# Patient Record
Sex: FEMALE | Race: WHITE | NOT HISPANIC OR LATINO | Employment: FULL TIME | URBAN - METROPOLITAN AREA
[De-identification: names, ages, dates, MRNs, and addresses within clinical notes are randomized per-mention and may not be internally consistent; named-entity substitution may affect disease eponyms.]

---

## 2017-02-09 ENCOUNTER — OFFICE VISIT (OUTPATIENT)
Dept: OPTOMETRY | Facility: CLINIC | Age: 62
End: 2017-02-09
Payer: COMMERCIAL

## 2017-02-09 DIAGNOSIS — Z46.0 FITTING AND ADJUSTMENT OF SPECTACLES AND CONTACT LENSES: Primary | ICD-10-CM

## 2017-02-09 DIAGNOSIS — Z83.511 FAMILY HISTORY OF GLAUCOMA IN BROTHER: ICD-10-CM

## 2017-02-09 DIAGNOSIS — H52.4 PRESBYOPIA: ICD-10-CM

## 2017-02-09 DIAGNOSIS — H40.003 GLAUCOMA SUSPECT, BILATERAL: ICD-10-CM

## 2017-02-09 DIAGNOSIS — H52.13 MYOPIA, BILATERAL: Primary | ICD-10-CM

## 2017-02-09 PROCEDURE — 99999 PR PBB SHADOW E&M-EST. PATIENT-LVL II: CPT | Mod: PBBFAC,,, | Performed by: OPTOMETRIST

## 2017-02-09 PROCEDURE — 92133 CPTRZD OPH DX IMG PST SGM ON: CPT | Mod: S$GLB,,, | Performed by: OPTOMETRIST

## 2017-02-09 PROCEDURE — 92004 COMPRE OPH EXAM NEW PT 1/>: CPT | Mod: S$GLB,,, | Performed by: OPTOMETRIST

## 2017-02-09 PROCEDURE — 92310 CONTACT LENS FITTING OU: CPT | Mod: ,,, | Performed by: OPTOMETRIST

## 2017-02-09 PROCEDURE — 92015 DETERMINE REFRACTIVE STATE: CPT | Mod: S$GLB,,, | Performed by: OPTOMETRIST

## 2017-02-09 NOTE — PROGRESS NOTES
HPI     Patient's age: 61 y.o.    Approximate date of last eye examination:  3/7/13  Name of last eye doctor seen: Dr. Asher  Pt states that she is here for   new eye exam for contacts and glasses, not having any problems with eyes.    Wears glasses? yes     Wears CLs?:  yes           If yes:              Type of CL worn:  Air [employer]ix              Wears full-time or part-time:  Full-time               Sleeps with contact lenses:  yes               Headaches?  no  Eye pain/discomfort?  no                                                                                     Flashes?  no  Floaters?  no  Diplopia/Double vision?  no    Patient's Ocular History:         Any eye surgeries? no        Family history of eye disease?  Glaucoma - Father    Significant patient medical history:         1. Diabetes?  no       If yes, IDDM or NIDDM? no   2. HBP?  no                 ! OTC eyedrops currently using:  Clear eyes - OU PRN   ! Prescription eye meds currently using:  none           Last edited by Andreea Mijares MA on 2/9/2017  9:45 AM.         Assessment /Plan     For exam results, see Encounter Report.    Myopia, bilateral  Presbyopia   Rx specs, SV distance    CL fit today: dispensed trials of BL ultra MF    Replace monthly   Also gave trials of CIba N&D with new fuentes   Ok to order whichever is preferred    Glaucoma suspect, bilateral  -     Posterior Segment OCT Optic Nerve- WNL OU   Prior testing WNL in 2013  Family history of glaucoma in brother and father      Mild NS OU   Monitor yearly

## 2017-06-06 ENCOUNTER — TELEPHONE (OUTPATIENT)
Dept: INTERNAL MEDICINE | Facility: CLINIC | Age: 62
End: 2017-06-06

## 2017-06-06 DIAGNOSIS — Z12.39 BREAST SCREENING: Primary | ICD-10-CM

## 2017-06-06 NOTE — TELEPHONE ENCOUNTER
----- Message from Ana Almaraz sent at 6/6/2017 11:28 AM CDT -----  Contact: self/[phone]  Pt called in regards to getting orders to get a mammogram done. Pt would like to get it done on Thursday (6-8-17) at 10:30.      Please advise

## 2017-06-08 ENCOUNTER — OFFICE VISIT (OUTPATIENT)
Dept: INTERNAL MEDICINE | Facility: CLINIC | Age: 62
End: 2017-06-08
Payer: COMMERCIAL

## 2017-06-08 ENCOUNTER — HOSPITAL ENCOUNTER (OUTPATIENT)
Dept: RADIOLOGY | Facility: HOSPITAL | Age: 62
Discharge: HOME | End: 2017-06-08
Attending: INTERNAL MEDICINE
Payer: COMMERCIAL

## 2017-06-08 VITALS
HEIGHT: 68 IN | BODY MASS INDEX: 21.31 KG/M2 | SYSTOLIC BLOOD PRESSURE: 108 MMHG | WEIGHT: 140.63 LBS | HEART RATE: 61 BPM | OXYGEN SATURATION: 98 % | DIASTOLIC BLOOD PRESSURE: 62 MMHG

## 2017-06-08 DIAGNOSIS — M81.0 OSTEOPOROSIS, UNSPECIFIED OSTEOPOROSIS TYPE, UNSPECIFIED PATHOLOGICAL FRACTURE PRESENCE: ICD-10-CM

## 2017-06-08 DIAGNOSIS — Z00.00 ANNUAL PHYSICAL EXAM: Primary | ICD-10-CM

## 2017-06-08 DIAGNOSIS — Z12.39 BREAST SCREENING: ICD-10-CM

## 2017-06-08 DIAGNOSIS — E03.9 HYPOTHYROIDISM, UNSPECIFIED TYPE: ICD-10-CM

## 2017-06-08 LAB
BILIRUB UR QL STRIP: NEGATIVE
CAOX CRY UR QL COMP ASSIST: NORMAL
CLARITY UR REFRACT.AUTO: ABNORMAL
COLOR UR AUTO: YELLOW
GLUCOSE UR QL STRIP: NEGATIVE
HGB UR QL STRIP: NEGATIVE
KETONES UR QL STRIP: NEGATIVE
LEUKOCYTE ESTERASE UR QL STRIP: NEGATIVE
MICROSCOPIC COMMENT: NORMAL
NITRITE UR QL STRIP: NEGATIVE
PH UR STRIP: 6 [PH] (ref 5–8)
PROT UR QL STRIP: NEGATIVE
SP GR UR STRIP: 1.02 (ref 1–1.03)
SQUAMOUS #/AREA URNS AUTO: 1 /HPF
URN SPEC COLLECT METH UR: ABNORMAL
UROBILINOGEN UR STRIP-ACNC: NEGATIVE EU/DL

## 2017-06-08 PROCEDURE — 99999 PR PBB SHADOW E&M-EST. PATIENT-LVL III: CPT | Mod: PBBFAC,,, | Performed by: NURSE PRACTITIONER

## 2017-06-08 PROCEDURE — 81001 URINALYSIS AUTO W/SCOPE: CPT

## 2017-06-08 PROCEDURE — 77067 SCR MAMMO BI INCL CAD: CPT | Mod: TC

## 2017-06-08 PROCEDURE — 77067 SCR MAMMO BI INCL CAD: CPT | Mod: 26,,, | Performed by: RADIOLOGY

## 2017-06-08 PROCEDURE — 77063 BREAST TOMOSYNTHESIS BI: CPT | Mod: 26,,, | Performed by: RADIOLOGY

## 2017-06-08 PROCEDURE — 99396 PREV VISIT EST AGE 40-64: CPT | Mod: S$GLB,,, | Performed by: NURSE PRACTITIONER

## 2017-06-08 RX ORDER — ALENDRONATE SODIUM 70 MG/1
70 TABLET ORAL
Qty: 4 TABLET | Refills: 11 | Status: SHIPPED | OUTPATIENT
Start: 2017-06-08 | End: 2018-08-28

## 2017-06-08 RX ORDER — LEVOTHYROXINE SODIUM 50 UG/1
50 TABLET ORAL DAILY
Qty: 90 TABLET | Refills: 3 | Status: SHIPPED | OUTPATIENT
Start: 2017-06-08 | End: 2017-08-11

## 2017-06-08 NOTE — PROGRESS NOTES
Internal Medicine Annual Exam       CHIEF COMPLAINT     The patient, Rosemary Parkinson, who is a 62 y.o. female presents for an annual exam.    HPI   Pt presents to the clinic for annual exam.     Hypothyroidism- compliant with levothyroxine.     MMG today     Dexa with t-score -2.5 score. Never received message about starting fosamax. Does take vit D and Calcium.       Past Medical History:  Past Medical History:   Diagnosis Date    Hx of adenomatous colonic polyps     Hypothyroid        Past Surgical History:   Procedure Laterality Date    APPENDECTOMY       SECTION      COLONOSCOPY N/A 2016    Procedure: COLONOSCOPY;  Surgeon: Mati Banks MD;  Location: [facility] ([address] [place] [address]);  Service: Endoscopy;  Laterality: N/A;    HYSTERECTOMY      TONSILLECTOMY, ADENOIDECTOMY      TOTAL ABDOMINAL HYSTERECTOMY W/ BILATERAL SALPINGOOPHORECTOMY          Family History   Problem Relation Age of Onset    Cancer Father      colon    Glaucoma Father     Cancer Maternal Grandmother 70     colon    Glaucoma Brother         Social History     Social History    Marital status:      Spouse name: N/A    Number of children: 4    Years of education: N/A     Occupational History     [employer]     Social History Main Topics    Smoking status: Never Smoker    Smokeless tobacco: Not on file      Comment: in college    Alcohol use No      Comment: social     Drug use: No    Sexual activity: Not on file      Comment: , 4 kids under 5      Other Topics Concern    Not on file     Social History Narrative    No narrative on file        History   Smoking Status    Never Smoker   Smokeless Tobacco    Not on file     Comment: in college        Allergies as of 2017 - Reviewed 2017   Allergen Reaction Noted    Demerol [meperidine] Other (See Comments) 12/10/2012          Home Medications:  Prior to Admission medications    Medication Sig Start Date End Date Taking? Authorizing  Provider   levothyroxine (SYNTHROID) 50 MCG tablet Take 1 tablet (50 mcg total) by mouth once daily. 5/25/16  Yes Cindy Elaine MD   TAZORAC 0.1 % cream  10/16/13   Historical Provider, MD       Review of Systems:  Review of Systems    Health Maintainence:   Immunizations:  Health Maintenance       Date Due Completion Date    Hepatitis C Screening 1955 ---    Zoster Vaccine 03/13/2015 ---    Mammogram 04/13/2017 4/13/2016    Influenza Vaccine 08/01/2017 9/25/2014    DEXA SCAN 05/27/2019 5/27/2016    Colonoscopy 05/02/2021 5/2/2016    Override on 6/7/2010: Done    Lipid Panel 05/10/2021 5/10/2016    TETANUS VACCINE 05/25/2026 5/25/2016           PHYSICAL EXAM     There were no vitals taken for this visit. There is no height or weight on file to calculate BMI.    Physical Exam    LABS     Lab Results   Component Value Date    HGBA1C 5.3 12/04/2012     CMP  Sodium   Date Value Ref Range Status   05/10/2016 142 136 - 145 mmol/L Final     Potassium   Date Value Ref Range Status   05/10/2016 4.5 3.5 - 5.1 mmol/L Final     Chloride   Date Value Ref Range Status   05/10/2016 108 95 - 110 mmol/L Final     CO2   Date Value Ref Range Status   05/10/2016 27 23 - 29 mmol/L Final     Glucose   Date Value Ref Range Status   05/10/2016 89 70 - 110 mg/dL Final     BUN, Bld   Date Value Ref Range Status   05/10/2016 15 8 - 23 mg/dL Final     Creatinine   Date Value Ref Range Status   05/10/2016 0.8 0.5 - 1.4 mg/dL Final     Calcium   Date Value Ref Range Status   05/10/2016 8.9 8.7 - 10.5 mg/dL Final     Total Protein   Date Value Ref Range Status   05/10/2016 6.7 6.0 - 8.4 g/dL Final     Albumin   Date Value Ref Range Status   05/10/2016 3.9 3.5 - 5.2 g/dL Final     Total Bilirubin   Date Value Ref Range Status   05/10/2016 0.4 0.1 - 1.0 mg/dL Final     Comment:     For infants and newborns, interpretation of results should be based  on gestational age, weight and in agreement with clinical  observations.  Premature Infant  recommended reference ranges:  Up to 24 hours.............<8.0 mg/dL  Up to 48 hours............<12.0 mg/dL  3-5 days..................<15.0 mg/dL  6-29 days.................<15.0 mg/dL       Alkaline Phosphatase   Date Value Ref Range Status   05/10/2016 62 55 - 135 U/L Final     AST   Date Value Ref Range Status   05/10/2016 22 10 - 40 U/L Final     ALT   Date Value Ref Range Status   05/10/2016 22 10 - 44 U/L Final     Anion Gap   Date Value Ref Range Status   05/10/2016 7 (L) 8 - 16 mmol/L Final     eGFR if    Date Value Ref Range Status   05/10/2016 >60.0 >60 mL/min/1.73 m^2 Final     eGFR if non    Date Value Ref Range Status   05/10/2016 >60.0 >60 mL/min/1.73 m^2 Final     Comment:     Calculation used to obtain the estimated glomerular filtration  rate (eGFR) is the CKD-EPI equation. Since race is unknown   in our information system, the eGFR values for   -American and Non--American patients are given   for each creatinine result.       Lab Results   Component Value Date    WBC 4.66 05/10/2016    HGB 13.3 05/10/2016    HCT 41.9 05/10/2016    MCV 91 05/10/2016     05/10/2016     Lab Results   Component Value Date    CHOL 223 (H) 05/10/2016    CHOL 224 (H) 12/04/2012     Lab Results   Component Value Date    HDL 72 05/10/2016    HDL 62 12/04/2012     Lab Results   Component Value Date    LDLCALC 139.2 05/10/2016    LDLCALC 139.0 12/04/2012     Lab Results   Component Value Date    TRIG 59 05/10/2016    TRIG 114 12/04/2012     Lab Results   Component Value Date    CHOLHDL 32.3 05/10/2016    CHOLHDL 27.7 12/04/2012     Lab Results   Component Value Date    TSH 3.057 05/10/2016       ASSESSMENT/PLAN     Rosemary Parkinson is a 62 y.o. female with  Past Medical History:   Diagnosis Date    Hx of adenomatous colonic polyps     Hypothyroid      Annual physical exam  -     CBC auto differential; Future; Expected date: 06/08/2017  -     Comprehensive metabolic panel;  Future; Expected date: 06/08/2017  -     Lipid panel; Future; Expected date: 06/08/2017  -     Hemoglobin A1c; Future; Expected date: 06/08/2017  -     TSH; Future; Expected date: 06/08/2017  -     T4, free; Future; Expected date: 06/08/2017  -     Urinalysis    Hypothyroidism, unspecified type- stable. Will check TSH today. Cont current dose.   -     levothyroxine (SYNTHROID) 50 MCG tablet; Take 1 tablet (50 mcg total) by mouth once daily.  Dispense: 90 tablet; Refill: 3    Osteoporosis, unspecified osteoporosis type, unspecified pathological fracture presence- Dexa 5/2016 with t-score- 2.5. Will start fosamax as pt did not receive message about this. Discussed weight bearing exercises. Cont vit D.   -     alendronate (FOSAMAX) 70 MG tablet; Take 1 tablet (70 mg total) by mouth every 7 days.  Dispense: 4 tablet; Refill: 11            Follow up with PCP in 1 year for annual.     Clotilde BARBOSA, APRN, FNP-c   Department of Internal Medicine - [facility]  9:29 AM

## 2017-06-08 NOTE — MEDICAL/APP STUDENT
Subjective:       Patient ID: Rosemary Parkinson is a 62 y.o. female.    Chief Complaint: Annual Exam    Ms. Parkinson presented to the clinic this morning for annual exam. Patient has no active complaints. She states that she keeps physically active with taking care of  her 4 children, who are all under the age of 9. Patient reports eating a healthy diet, low in fats and red meats. She does endorse occasional bloating, which she attributes to lactose intolerance and is trying probiotics. Per her most recent DEXA scan, completed 5/27/2016, she has osteoporosis.       Review of Systems   Constitutional: Negative for activity change, appetite change, fatigue and unexpected weight change.   HENT: Negative for congestion, postnasal drip and sore throat.    Eyes: Negative.    Respiratory: Negative for cough, shortness of breath and wheezing.    Cardiovascular: Negative for chest pain and palpitations.   Gastrointestinal: Negative for abdominal pain, constipation, diarrhea and nausea.        Occasional bloating   Endocrine: Negative.    Genitourinary: Negative.    Musculoskeletal: Negative.    Skin: Negative.    Allergic/Immunologic: Negative.    Neurological: Negative for dizziness, numbness and headaches.   Hematological: Does not bruise/bleed easily.   Psychiatric/Behavioral: Negative for sleep disturbance. The patient is not nervous/anxious.        Objective:      Physical Exam   Constitutional: She is oriented to person, place, and time. She appears well-developed and well-nourished. No distress.   HENT:   Head: Normocephalic.   Eyes: Conjunctivae and EOM are normal. Pupils are equal, round, and reactive to light.   Neck: Normal range of motion. Neck supple. No thyromegaly present.   Cardiovascular: Normal rate, regular rhythm, normal heart sounds and intact distal pulses.    Pulmonary/Chest: Effort normal and breath sounds normal.   Abdominal: Soft. Bowel sounds are normal. She exhibits no distension. There is no tenderness.    Musculoskeletal: Normal range of motion. She exhibits no edema.   Neurological: She is alert and oriented to person, place, and time.   Skin: Skin is warm and dry. Capillary refill takes less than 2 seconds.   Psychiatric: She has a normal mood and affect. Her behavior is normal. Judgment and thought content normal.       Assessment:       1. Annual well exam  2. Osteoporosis  3. Lactose intolerance     Plan:       Annual exam   - Patient appears well and has no acute complaints. Physical exam with no abnormal findings. Discussed health maintenance and preventative measures. Patient up to date on colonoscopy and is going for yearly mammogram today.    - Ordered labs: CBC, CMP, TSH, Free T4, Lipid panel, Hgb A1c, urinalysis   - Placed refills for synthroid 50 mcg 1 tablet oral daily    Osteoporosis   - Discussed results of previous two DEXA scans with patient   - Start Fosamax 70 mg 1 tab oral every week   - Take with full glass of water and remain upright x 1 hour   - Continue Calcium and vitamin D chewables daily   - Load bearing exercises discussed and encouraged    Lactose intolerance   - avoid offending food and drinks   - continue probiotics    Return to clinic as needed and provider will release labwork through online portal and discuss any abnormal values.

## 2017-06-09 ENCOUNTER — TELEPHONE (OUTPATIENT)
Dept: INTERNAL MEDICINE | Facility: CLINIC | Age: 62
End: 2017-06-09

## 2017-06-09 NOTE — TELEPHONE ENCOUNTER
----- Message from Clotilde Cruz NP sent at 6/9/2017  7:39 AM CDT -----  Please let the patient know that blood work was negative for kidney and liver disease, electrolyte imbalances, anemia and infection. Cholesterol normal. Blood sugar normal.   Please let me know if they have any questions.   Francisco,   MPH

## 2017-08-11 RX ORDER — LEVOTHYROXINE SODIUM 50 UG/1
TABLET ORAL
Qty: 90 TABLET | Refills: 3 | Status: SHIPPED | OUTPATIENT
Start: 2017-08-11 | End: 2018-08-12 | Stop reason: SDUPTHER

## 2018-06-04 ENCOUNTER — TELEPHONE (OUTPATIENT)
Dept: INTERNAL MEDICINE | Facility: CLINIC | Age: 63
End: 2018-06-04

## 2018-06-04 DIAGNOSIS — Z12.39 BREAST SCREENING: Primary | ICD-10-CM

## 2018-06-04 NOTE — TELEPHONE ENCOUNTER
----- Message from Gem Fernandes sent at 6/4/2018  9:25 AM CDT -----  Contact: self   Caller is requesting to schedule their annual screening mammogram appointment. Order is not listed in Epic.  Please enter order and contact patient to schedule.    Where would they like the mammogram performed?:  [facility]  Additional information:

## 2018-06-05 ENCOUNTER — TELEPHONE (OUTPATIENT)
Dept: INTERNAL MEDICINE | Facility: CLINIC | Age: 63
End: 2018-06-05

## 2018-06-05 DIAGNOSIS — Z00.00 ROUTINE PHYSICAL EXAMINATION: Primary | ICD-10-CM

## 2018-06-05 DIAGNOSIS — M81.0 OSTEOPOROSIS, POST-MENOPAUSAL: ICD-10-CM

## 2018-06-05 NOTE — TELEPHONE ENCOUNTER
Called and spoke to pt and sched all of pt's tests, but pt will have to call back to sched an apt with PCP

## 2018-06-05 NOTE — TELEPHONE ENCOUNTER
----- Message from Lawrence Peñaloza sent at 6/5/2018 11:26 AM CDT -----  Contact: self/[phone]  Pt is calling to speak with someone in the office in regards to getting a call back about when her next DEXA bone density test is due. Please advise.      Thanks

## 2018-07-02 ENCOUNTER — HOSPITAL ENCOUNTER (OUTPATIENT)
Dept: RADIOLOGY | Facility: HOSPITAL | Age: 63
Discharge: HOME | End: 2018-07-02
Attending: INTERNAL MEDICINE
Payer: COMMERCIAL

## 2018-07-02 ENCOUNTER — HOSPITAL ENCOUNTER (OUTPATIENT)
Dept: RADIOLOGY | Facility: CLINIC | Age: 63
Discharge: HOME | End: 2018-07-02
Attending: INTERNAL MEDICINE
Payer: COMMERCIAL

## 2018-07-02 VITALS — HEIGHT: 68 IN | WEIGHT: 140 LBS | BODY MASS INDEX: 21.22 KG/M2

## 2018-07-02 DIAGNOSIS — Z12.39 BREAST SCREENING: ICD-10-CM

## 2018-07-02 DIAGNOSIS — M81.0 OSTEOPOROSIS, POST-MENOPAUSAL: ICD-10-CM

## 2018-07-02 PROCEDURE — 77063 BREAST TOMOSYNTHESIS BI: CPT | Mod: 26,,, | Performed by: RADIOLOGY

## 2018-07-02 PROCEDURE — 77067 SCR MAMMO BI INCL CAD: CPT | Mod: 26,,, | Performed by: RADIOLOGY

## 2018-07-02 PROCEDURE — 77080 DXA BONE DENSITY AXIAL: CPT | Mod: TC

## 2018-07-02 PROCEDURE — 77080 DXA BONE DENSITY AXIAL: CPT | Mod: 26,,, | Performed by: INTERNAL MEDICINE

## 2018-07-02 PROCEDURE — 77067 SCR MAMMO BI INCL CAD: CPT | Mod: TC

## 2018-07-20 ENCOUNTER — TELEPHONE (OUTPATIENT)
Dept: INTERNAL MEDICINE | Facility: CLINIC | Age: 63
End: 2018-07-20

## 2018-07-20 NOTE — TELEPHONE ENCOUNTER
Please also see the result notes  ----- Message from Cindy Elaine MD sent at 7/20/2018  2:28 PM CDT -----  pls make PE appt to discuss dexa and genreal health

## 2018-08-13 RX ORDER — LEVOTHYROXINE SODIUM 50 UG/1
TABLET ORAL
Qty: 90 TABLET | Refills: 3 | Status: SHIPPED | OUTPATIENT
Start: 2018-08-13 | End: 2019-08-27 | Stop reason: SDUPTHER

## 2018-08-28 ENCOUNTER — OFFICE VISIT (OUTPATIENT)
Dept: INTERNAL MEDICINE | Facility: CLINIC | Age: 63
End: 2018-08-28
Payer: COMMERCIAL

## 2018-08-28 ENCOUNTER — LAB VISIT (OUTPATIENT)
Dept: LAB | Facility: HOSPITAL | Age: 63
End: 2018-08-28
Attending: INTERNAL MEDICINE
Payer: COMMERCIAL

## 2018-08-28 VITALS
WEIGHT: 137.81 LBS | HEART RATE: 56 BPM | DIASTOLIC BLOOD PRESSURE: 64 MMHG | BODY MASS INDEX: 20.89 KG/M2 | SYSTOLIC BLOOD PRESSURE: 110 MMHG | OXYGEN SATURATION: 98 % | HEIGHT: 68 IN

## 2018-08-28 DIAGNOSIS — M81.0 OSTEOPOROSIS, UNSPECIFIED OSTEOPOROSIS TYPE, UNSPECIFIED PATHOLOGICAL FRACTURE PRESENCE: ICD-10-CM

## 2018-08-28 DIAGNOSIS — E03.9 HYPOTHYROIDISM, UNSPECIFIED TYPE: ICD-10-CM

## 2018-08-28 DIAGNOSIS — Z00.00 ANNUAL PHYSICAL EXAM: Primary | ICD-10-CM

## 2018-08-28 DIAGNOSIS — Z00.00 ANNUAL PHYSICAL EXAM: ICD-10-CM

## 2018-08-28 LAB — 25(OH)D3+25(OH)D2 SERPL-MCNC: 41 NG/ML

## 2018-08-28 PROCEDURE — 36415 COLL VENOUS BLD VENIPUNCTURE: CPT

## 2018-08-28 PROCEDURE — 99396 PREV VISIT EST AGE 40-64: CPT | Mod: S$GLB,,, | Performed by: INTERNAL MEDICINE

## 2018-08-28 PROCEDURE — 86803 HEPATITIS C AB TEST: CPT

## 2018-08-28 PROCEDURE — 99999 PR PBB SHADOW E&M-EST. PATIENT-LVL III: CPT | Mod: PBBFAC,,, | Performed by: INTERNAL MEDICINE

## 2018-08-28 PROCEDURE — 82306 VITAMIN D 25 HYDROXY: CPT

## 2018-08-28 NOTE — PROGRESS NOTES
Subjective:       Patient ID: Rosemary Parkinson is a 63 y.o. female.    Chief Complaint: PE  HPI   Doing well with present synthroid dose.       Wt stable.  No diarrhea, anxiety, etc.      She watches diet carefully to maintain eight.       Active.  Stretches.  Plans to start walking.      Had kids in her 50's.    Dexa:  Osteoporosis.  Took fosamax before pregnancies.    No h/o fractures.  Review of Systems   Constitutional: Negative for fever and unexpected weight change.   HENT: Negative for congestion and postnasal drip.    Respiratory: Negative for chest tightness, shortness of breath and wheezing.    Cardiovascular: Negative for chest pain and leg swelling.   Gastrointestinal: Negative for abdominal pain, anal bleeding, constipation, diarrhea, nausea and vomiting.   Genitourinary: Negative for dysuria and urgency.   Skin: Negative for rash.   Neurological: Negative for headaches.   Psychiatric/Behavioral: Negative for dysphoric mood and sleep disturbance. The patient is not nervous/anxious.        Objective:      Physical Exam   Constitutional: She is oriented to person, place, and time. She appears well-developed and well-nourished. No distress.   Neurological: She is alert and oriented to person, place, and time. No cranial nerve deficit or sensory deficit. She exhibits normal muscle tone. Coordination normal.       Results for orders placed or performed in visit on 07/02/18   CBC auto differential   Result Value Ref Range    WBC 4.22 3.90 - 12.70 K/uL    RBC 4.32 4.00 - 5.40 M/uL    Hemoglobin 12.9 12.0 - 16.0 g/dL    Hematocrit 39.5 37.0 - 48.5 %    MCV 91 82 - 98 fL    MCH 29.9 27.0 - 31.0 pg    MCHC 32.7 32.0 - 36.0 g/dL    RDW 14.7 (H) 11.5 - 14.5 %    Platelets 178 150 - 350 K/uL    MPV 10.7 9.2 - 12.9 fL    Gran # (ANC) 1.8 1.8 - 7.7 K/uL    Lymph # 1.9 1.0 - 4.8 K/uL    Mono # 0.4 0.3 - 1.0 K/uL    Eos # 0.1 0.0 - 0.5 K/uL    Baso # 0.02 0.00 - 0.20 K/uL    Gran% 43.4 38.0 - 73.0 %    Lymph% 43.8 18.0 -  48.0 %    Mono% 10.4 4.0 - 15.0 %    Eosinophil% 1.9 0.0 - 8.0 %    Basophil% 0.5 0.0 - 1.9 %    Differential Method Automated    Comprehensive metabolic panel   Result Value Ref Range    Sodium 140 136 - 145 mmol/L    Potassium 3.8 3.5 - 5.1 mmol/L    Chloride 105 95 - 110 mmol/L    CO2 26 23 - 29 mmol/L    Glucose 94 70 - 110 mg/dL    BUN, Bld 16 8 - 23 mg/dL    Creatinine 0.7 0.5 - 1.4 mg/dL    Calcium 9.3 8.7 - 10.5 mg/dL    Total Protein 6.8 6.0 - 8.4 g/dL    Albumin 4.2 3.5 - 5.2 g/dL    Total Bilirubin 0.5 0.1 - 1.0 mg/dL    Alkaline Phosphatase 49 (L) 55 - 135 U/L    AST 20 10 - 40 U/L    ALT 19 10 - 44 U/L    Anion Gap 9 8 - 16 mmol/L    eGFR if African American >60 >60 mL/min/1.73 m^2    eGFR if non African American >60 >60 mL/min/1.73 m^2   TSH   Result Value Ref Range    TSH 4.868 (H) 0.400 - 4.000 uIU/mL   Lipid panel   Result Value Ref Range    Cholesterol 217 (H) 120 - 199 mg/dL    Triglycerides 43 30 - 150 mg/dL    HDL 68 40 - 75 mg/dL    LDL Cholesterol 140.4 63.0 - 159.0 mg/dL    HDL/Chol Ratio 31.3 20.0 - 50.0 %    Total Cholesterol/HDL Ratio 3.2 2.0 - 5.0    Non-HDL Cholesterol 149 mg/dL   T4, free   Result Value Ref Range    Free T4 1.00 0.71 - 1.51 ng/dL     Assessment:       1. Annual physical exam    2. Osteoporosis, unspecified osteoporosis type, unspecified pathological fracture presence    3. Hypothyroidism, unspecified type                           - tsh borderline elevated.  Discussed.  Plan:       Rosemary was seen today for follow-up.    Diagnoses and all orders for this visit:    Annual physical exam  -     Vitamin D; Future  -     Hepatitis C antibody; Future    Osteoporosis, unspecified osteoporosis type, unspecified pathological fracture presence    Hypothyroidism, unspecified type       Info on tx of osteoporosis given.  Encouraged to consider fosamax.  Daily exercise; core building..  Calcium and vit d requirements reviewed.

## 2018-08-28 NOTE — PATIENT INSTRUCTIONS
Prolia - injection every 6 months    Reclast once a year.    Fosamax 70 mg weekly.    Vit D - 1000 IU daily.  Calcium 1000 mg daily.  Cup milk/yogurt = 300 mg servings.

## 2018-08-29 LAB — HCV AB SERPL QL IA: NEGATIVE

## 2018-10-19 ENCOUNTER — IMMUNIZATION (OUTPATIENT)
Dept: PHARMACY | Facility: CLINIC | Age: 63
End: 2018-10-19
Payer: COMMERCIAL

## 2019-08-27 DIAGNOSIS — Z00.00 ANNUAL PHYSICAL EXAM: Primary | ICD-10-CM

## 2019-08-27 RX ORDER — LEVOTHYROXINE SODIUM 50 UG/1
TABLET ORAL
Qty: 90 TABLET | Refills: 0 | Status: SHIPPED | OUTPATIENT
Start: 2019-08-27 | End: 2019-10-22

## 2019-10-14 ENCOUNTER — TELEPHONE (OUTPATIENT)
Dept: INTERNAL MEDICINE | Facility: CLINIC | Age: 64
End: 2019-10-14

## 2019-10-14 DIAGNOSIS — Z12.39 BREAST SCREENING: Primary | ICD-10-CM

## 2019-10-14 NOTE — TELEPHONE ENCOUNTER
----- Message from Deanna Ramos sent at 10/14/2019  8:45 AM CDT -----  Contact: self   Caller is requesting to schedule their annual screening mammogram appointment. Order is not listed in Epic.  Please enter order and contact patient to schedule.  Where would they [facility] the [facility]  Would the patient rather receive a phone call back or a response via [facility]?:   Call back  Additional information:

## 2019-10-17 ENCOUNTER — LAB VISIT (OUTPATIENT)
Dept: LAB | Facility: HOSPITAL | Age: 64
End: 2019-10-17
Attending: INTERNAL MEDICINE
Payer: COMMERCIAL

## 2019-10-17 DIAGNOSIS — Z00.00 ANNUAL PHYSICAL EXAM: ICD-10-CM

## 2019-10-17 LAB
ALBUMIN SERPL BCP-MCNC: 4.2 G/DL (ref 3.5–5.2)
ALP SERPL-CCNC: 55 U/L (ref 55–135)
ALT SERPL W/O P-5'-P-CCNC: 14 U/L (ref 10–44)
ANION GAP SERPL CALC-SCNC: 8 MMOL/L (ref 8–16)
AST SERPL-CCNC: 18 U/L (ref 10–40)
BASOPHILS # BLD AUTO: 0.01 K/UL (ref 0–0.2)
BASOPHILS NFR BLD: 0.2 % (ref 0–1.9)
BILIRUB SERPL-MCNC: 0.6 MG/DL (ref 0.1–1)
BUN SERPL-MCNC: 13 MG/DL (ref 8–23)
CALCIUM SERPL-MCNC: 9.3 MG/DL (ref 8.7–10.5)
CHLORIDE SERPL-SCNC: 104 MMOL/L (ref 95–110)
CO2 SERPL-SCNC: 28 MMOL/L (ref 23–29)
CREAT SERPL-MCNC: 0.8 MG/DL (ref 0.5–1.4)
DIFFERENTIAL METHOD: NORMAL
EOSINOPHIL # BLD AUTO: 0.1 K/UL (ref 0–0.5)
EOSINOPHIL NFR BLD: 1.4 % (ref 0–8)
ERYTHROCYTE [DISTWIDTH] IN BLOOD BY AUTOMATED COUNT: 13.7 % (ref 11.5–14.5)
EST. GFR  (AFRICAN AMERICAN): >60 ML/MIN/1.73 M^2
EST. GFR  (NON AFRICAN AMERICAN): >60 ML/MIN/1.73 M^2
GLUCOSE SERPL-MCNC: 78 MG/DL (ref 70–110)
HCT VFR BLD AUTO: 41.6 % (ref 37–48.5)
HGB BLD-MCNC: 13.5 G/DL (ref 12–16)
LYMPHOCYTES # BLD AUTO: 1.7 K/UL (ref 1–4.8)
LYMPHOCYTES NFR BLD: 41.2 % (ref 18–48)
MCH RBC QN AUTO: 30 PG (ref 27–31)
MCHC RBC AUTO-ENTMCNC: 32.5 G/DL (ref 32–36)
MCV RBC AUTO: 92 FL (ref 82–98)
MONOCYTES # BLD AUTO: 0.4 K/UL (ref 0.3–1)
MONOCYTES NFR BLD: 9.4 % (ref 4–15)
NEUTROPHILS # BLD AUTO: 2 K/UL (ref 1.8–7.7)
NEUTROPHILS NFR BLD: 47.8 % (ref 38–73)
PLATELET # BLD AUTO: 196 K/UL (ref 150–350)
PMV BLD AUTO: 10.6 FL (ref 9.2–12.9)
POTASSIUM SERPL-SCNC: 4.5 MMOL/L (ref 3.5–5.1)
PROT SERPL-MCNC: 6.8 G/DL (ref 6–8.4)
RBC # BLD AUTO: 4.5 M/UL (ref 4–5.4)
SODIUM SERPL-SCNC: 140 MMOL/L (ref 136–145)
T4 FREE SERPL-MCNC: 1.09 NG/DL (ref 0.71–1.51)
TSH SERPL DL<=0.005 MIU/L-ACNC: 5.63 UIU/ML (ref 0.4–4)
WBC # BLD AUTO: 4.15 K/UL (ref 3.9–12.7)

## 2019-10-17 PROCEDURE — 84439 ASSAY OF FREE THYROXINE: CPT

## 2019-10-17 PROCEDURE — 36415 COLL VENOUS BLD VENIPUNCTURE: CPT

## 2019-10-17 PROCEDURE — 80053 COMPREHEN METABOLIC PANEL: CPT

## 2019-10-17 PROCEDURE — 85025 COMPLETE CBC W/AUTO DIFF WBC: CPT

## 2019-10-17 PROCEDURE — 84443 ASSAY THYROID STIM HORMONE: CPT

## 2019-10-18 ENCOUNTER — HOSPITAL ENCOUNTER (OUTPATIENT)
Dept: RADIOLOGY | Facility: HOSPITAL | Age: 64
Discharge: HOME | End: 2019-10-18
Attending: INTERNAL MEDICINE
Payer: COMMERCIAL

## 2019-10-18 VITALS — BODY MASS INDEX: 20.83 KG/M2 | WEIGHT: 137 LBS

## 2019-10-18 DIAGNOSIS — Z12.39 BREAST SCREENING: ICD-10-CM

## 2019-10-18 PROCEDURE — 77067 MAMMO DIGITAL SCREENING BILAT WITH TOMOSYNTHESIS_CAD: ICD-10-PCS | Mod: 26,,, | Performed by: RADIOLOGY

## 2019-10-18 PROCEDURE — 77063 MAMMO DIGITAL SCREENING BILAT WITH TOMOSYNTHESIS_CAD: ICD-10-PCS | Mod: 26,,, | Performed by: RADIOLOGY

## 2019-10-18 PROCEDURE — 77063 BREAST TOMOSYNTHESIS BI: CPT | Mod: 26,,, | Performed by: RADIOLOGY

## 2019-10-18 PROCEDURE — 77067 SCR MAMMO BI INCL CAD: CPT | Mod: 26,,, | Performed by: RADIOLOGY

## 2019-10-18 PROCEDURE — 77063 BREAST TOMOSYNTHESIS BI: CPT | Mod: TC

## 2019-10-22 ENCOUNTER — OFFICE VISIT (OUTPATIENT)
Dept: INTERNAL MEDICINE | Facility: CLINIC | Age: 64
End: 2019-10-22
Payer: COMMERCIAL

## 2019-10-22 ENCOUNTER — IMMUNIZATION (OUTPATIENT)
Dept: PHARMACY | Facility: CLINIC | Age: 64
End: 2019-10-22
Payer: COMMERCIAL

## 2019-10-22 VITALS
HEIGHT: 68 IN | DIASTOLIC BLOOD PRESSURE: 64 MMHG | OXYGEN SATURATION: 98 % | HEART RATE: 61 BPM | WEIGHT: 137.56 LBS | SYSTOLIC BLOOD PRESSURE: 108 MMHG | BODY MASS INDEX: 20.85 KG/M2

## 2019-10-22 DIAGNOSIS — Z00.00 ANNUAL PHYSICAL EXAM: Primary | ICD-10-CM

## 2019-10-22 DIAGNOSIS — E03.9 HYPOTHYROIDISM, UNSPECIFIED TYPE: ICD-10-CM

## 2019-10-22 DIAGNOSIS — M81.0 OSTEOPOROSIS, POST-MENOPAUSAL: ICD-10-CM

## 2019-10-22 PROCEDURE — 99396 PREV VISIT EST AGE 40-64: CPT | Mod: S$GLB,,, | Performed by: INTERNAL MEDICINE

## 2019-10-22 PROCEDURE — 99999 PR PBB SHADOW E&M-EST. PATIENT-LVL III: CPT | Mod: PBBFAC,,, | Performed by: INTERNAL MEDICINE

## 2019-10-22 PROCEDURE — 99999 PR PBB SHADOW E&M-EST. PATIENT-LVL III: ICD-10-PCS | Mod: PBBFAC,,, | Performed by: INTERNAL MEDICINE

## 2019-10-22 PROCEDURE — 99396 PR PREVENTIVE VISIT,EST,40-64: ICD-10-PCS | Mod: S$GLB,,, | Performed by: INTERNAL MEDICINE

## 2019-10-22 RX ORDER — LEVOTHYROXINE SODIUM 75 UG/1
75 TABLET ORAL DAILY
Qty: 90 TABLET | Refills: 3 | Status: SHIPPED | OUTPATIENT
Start: 2019-10-22 | End: 2020-10-14 | Stop reason: SDUPTHER

## 2019-10-22 NOTE — PROGRESS NOTES
Subjective:       Patient ID: Rosemary Parkinson is a 64 y.o. female.    Chief Complaint: Annual Exam    HPI   Feeing well.      She has hypothyroidism, and tsh is elevated.  She is taking synthroid 50 mcg.      She has osteoporosis.  She declined treatment last year.  Took fosamax about 15 years ago, but unclear about efficacy.      Karen Sparks's first cousin.    Review of Systems   Constitutional: Negative for fever and unexpected weight change.   HENT: Negative for congestion and postnasal drip.    Eyes: Negative for pain, discharge and visual disturbance.   Respiratory: Negative for cough, chest tightness, shortness of breath and wheezing.    Cardiovascular: Negative for chest pain and leg swelling.   Gastrointestinal: Negative for abdominal pain, constipation, diarrhea and nausea.   Genitourinary: Negative for difficulty urinating, dysuria and hematuria.   Skin: Negative for rash.   Neurological: Negative for headaches.   Psychiatric/Behavioral: Negative for dysphoric mood and sleep disturbance. The patient is not nervous/anxious.        Objective:      Physical Exam   Constitutional: She is oriented to person, place, and time. She appears well-developed and well-nourished. No distress.   HENT:   Head: Normocephalic and atraumatic.   Right Ear: External ear normal.   Left Ear: External ear normal.   Nose: Nose normal.   Mouth/Throat: Oropharynx is clear and moist.   Eyes: Pupils are equal, round, and reactive to light. Conjunctivae and EOM are normal. Right eye exhibits no discharge. Left eye exhibits no discharge. No scleral icterus.   Neck: Normal range of motion. Neck supple. No JVD present. No thyromegaly present.   Cardiovascular: Normal rate, regular rhythm and normal heart sounds. Exam reveals no gallop.   No murmur heard.  Pulmonary/Chest: Effort normal and breath sounds normal. No respiratory distress. She has no wheezes. She has no rales. No breast tenderness, discharge or bleeding.   Abdominal: Soft.  Bowel sounds are normal. She exhibits no distension and no mass. There is no tenderness. There is no rebound and no guarding.   Genitourinary: No breast tenderness, discharge or bleeding.   Musculoskeletal: Normal range of motion. She exhibits no edema or tenderness.   Lymphadenopathy:     She has no cervical adenopathy.   Neurological: She is alert and oriented to person, place, and time. No cranial nerve deficit. Coordination normal.   Skin: Skin is warm and dry. No rash noted.   Psychiatric: She has a normal mood and affect. Her behavior is normal. Judgment and thought content normal.       Results for orders placed or performed in visit on 10/17/19   CBC auto differential   Result Value Ref Range    WBC 4.15 3.90 - 12.70 K/uL    RBC 4.50 4.00 - 5.40 M/uL    Hemoglobin 13.5 12.0 - 16.0 g/dL    Hematocrit 41.6 37.0 - 48.5 %    Mean Corpuscular Volume 92 82 - 98 fL    Mean Corpuscular Hemoglobin 30.0 27.0 - 31.0 pg    Mean Corpuscular Hemoglobin Conc 32.5 32.0 - 36.0 g/dL    RDW 13.7 11.5 - 14.5 %    Platelets 196 150 - 350 K/uL    MPV 10.6 9.2 - 12.9 fL    Gran # (ANC) 2.0 1.8 - 7.7 K/uL    Lymph # 1.7 1.0 - 4.8 K/uL    Mono # 0.4 0.3 - 1.0 K/uL    Eos # 0.1 0.0 - 0.5 K/uL    Baso # 0.01 0.00 - 0.20 K/uL    Gran% 47.8 38.0 - 73.0 %    Lymph% 41.2 18.0 - 48.0 %    Mono% 9.4 4.0 - 15.0 %    Eosinophil% 1.4 0.0 - 8.0 %    Basophil% 0.2 0.0 - 1.9 %    Differential Method Automated    Comprehensive metabolic panel   Result Value Ref Range    Sodium 140 136 - 145 mmol/L    Potassium 4.5 3.5 - 5.1 mmol/L    Chloride 104 95 - 110 mmol/L    CO2 28 23 - 29 mmol/L    Glucose 78 70 - 110 mg/dL    BUN, Bld 13 8 - 23 mg/dL    Creatinine 0.8 0.5 - 1.4 mg/dL    Calcium 9.3 8.7 - 10.5 mg/dL    Total Protein 6.8 6.0 - 8.4 g/dL    Albumin 4.2 3.5 - 5.2 g/dL    Total Bilirubin 0.6 0.1 - 1.0 mg/dL    Alkaline Phosphatase 55 55 - 135 U/L    AST 18 10 - 40 U/L    ALT 14 10 - 44 U/L    Anion Gap 8 8 - 16 mmol/L    eGFR if African  American >60 >60 mL/min/1.73 m^2    eGFR if non African American >60 >60 mL/min/1.73 m^2   TSH   Result Value Ref Range    TSH 5.628 (H) 0.400 - 4.000 uIU/mL   T4, free   Result Value Ref Range    Free T4 1.09 0.71 - 1.51 ng/dL     Assessment:       1. Annual physical exam    2. Hypothyroidism, unspecified type    3. Osteoporosis, post-menopausal        Plan:       Rosemary was seen today for annual exam.    Diagnoses and all orders for this visit:    Annual physical exam    Hypothyroidism, unspecified type  -     TSH; Future    Osteoporosis, post-menopausal  -     DXA Bone Density Spine And Hip; Future    Other orders  -     levothyroxine (SYNTHROID) 75 MCG tablet; Take 1 tablet (75 mcg total) by mouth once daily.

## 2019-11-05 ENCOUNTER — IMMUNIZATION (OUTPATIENT)
Dept: PHARMACY | Facility: CLINIC | Age: 64
End: 2019-11-05
Payer: COMMERCIAL

## 2019-11-19 ENCOUNTER — PATIENT MESSAGE (OUTPATIENT)
Dept: PHARMACY | Facility: CLINIC | Age: 64
End: 2019-11-19

## 2020-10-14 ENCOUNTER — TELEPHONE (OUTPATIENT)
Dept: INTERNAL MEDICINE | Facility: CLINIC | Age: 65
End: 2020-10-14

## 2020-10-14 RX ORDER — LEVOTHYROXINE SODIUM 75 UG/1
75 TABLET ORAL DAILY
Qty: 90 TABLET | Refills: 0 | Status: SHIPPED | OUTPATIENT
Start: 2020-10-14 | End: 2021-01-06 | Stop reason: SDUPTHER

## 2020-10-14 NOTE — TELEPHONE ENCOUNTER
----- Message from Cleopatra Franklin sent at 10/14/2020 10:51 AM CDT -----  Regarding: lab order  Contact: Patient [phone]  Doctor appointment and lab have been scheduled.  Please link lab orders to the lab appointment.  Date of doctor appointment:  11-9-2020  Date of lab appointment:    Physical or F/U: Physical

## 2020-10-14 NOTE — TELEPHONE ENCOUNTER
Was unable to leave voice mail for patient.  Reason for calling..    Patient will need to be seen before orders can be sign since they haven't been seen before

## 2020-10-14 NOTE — TELEPHONE ENCOUNTER
----- Message from Cleopatra Franklin sent at 10/14/2020 10:52 AM CDT -----  Regarding: refill  Contact: Patient [phone]  Requesting an RX refill or new RX.  Is this a refill or new RX:  refill  RX name and strength: levothyroxine (SYNTHROID) 75 MCG tablet  Is this a 30 day or 90 day RX:  30  Pharmacy name and phone #[facility] [phone] (Phone)  [phone] (Fax)

## 2020-10-14 NOTE — TELEPHONE ENCOUNTER
Care Due:                  Date            Visit Type   Department     Provider  --------------------------------------------------------------------------------                                PHYSICAL -                              ESTABLISHED   [facility]  Last Visit: 10-      PATIENT      MEDICINE       SUGEY RIOS  Next Visit: None Scheduled  None         None Found                                                            Last  Test          Frequency    Reason                     Performed    Due Date  --------------------------------------------------------------------------------    Office Visit  12 months..  levothyroxine............  10-   10-    TSH.........  12 months..  levothyroxine............  10-   10-    Powered by [employer]. Reference number: [id number]. 10/14/2020 11:11:43 AM   CDT

## 2020-10-15 ENCOUNTER — HOSPITAL ENCOUNTER (OUTPATIENT)
Dept: RADIOLOGY | Facility: OTHER | Age: 65
Discharge: HOME | End: 2020-10-15
Attending: INTERNAL MEDICINE
Payer: COMMERCIAL

## 2020-10-15 DIAGNOSIS — M81.0 OSTEOPOROSIS, POST-MENOPAUSAL: ICD-10-CM

## 2020-10-15 PROCEDURE — 77080 DXA BONE DENSITY AXIAL: CPT | Mod: TC

## 2020-10-15 PROCEDURE — 77080 DEXA BONE DENSITY SPINE HIP: ICD-10-PCS | Mod: 26,,, | Performed by: RADIOLOGY

## 2020-10-15 PROCEDURE — 77080 DXA BONE DENSITY AXIAL: CPT | Mod: 26,,, | Performed by: RADIOLOGY

## 2020-10-22 ENCOUNTER — TELEPHONE (OUTPATIENT)
Dept: INTERNAL MEDICINE | Facility: CLINIC | Age: 65
End: 2020-10-22

## 2020-10-22 DIAGNOSIS — Z12.31 ENCOUNTER FOR SCREENING MAMMOGRAM FOR MALIGNANT NEOPLASM OF BREAST: Primary | ICD-10-CM

## 2020-10-22 NOTE — TELEPHONE ENCOUNTER
----- Message from Jordon Godwin sent at 10/22/2020 10:05 AM CDT -----  Patient called to speak w/ someone regarding orders for her annual Mammo screening, requesting callback to confirm that orders have been submitted [phone]

## 2020-10-26 ENCOUNTER — PATIENT OUTREACH (OUTPATIENT)
Dept: FAMILY MEDICINE | Facility: CLINIC | Age: 65
End: 2020-10-26

## 2020-11-04 ENCOUNTER — HOSPITAL ENCOUNTER (OUTPATIENT)
Dept: RADIOLOGY | Facility: HOSPITAL | Age: 65
Discharge: HOME | End: 2020-11-04
Attending: INTERNAL MEDICINE
Payer: COMMERCIAL

## 2020-11-04 VITALS — BODY MASS INDEX: 19.71 KG/M2 | HEIGHT: 68 IN | WEIGHT: 130.06 LBS

## 2020-11-04 DIAGNOSIS — Z12.31 ENCOUNTER FOR SCREENING MAMMOGRAM FOR MALIGNANT NEOPLASM OF BREAST: ICD-10-CM

## 2020-11-04 PROCEDURE — 77067 SCR MAMMO BI INCL CAD: CPT | Mod: 26,,, | Performed by: RADIOLOGY

## 2020-11-04 PROCEDURE — 77063 MAMMO DIGITAL SCREENING BILAT WITH TOMO: ICD-10-PCS | Mod: 26,,, | Performed by: RADIOLOGY

## 2020-11-04 PROCEDURE — 77067 MAMMO DIGITAL SCREENING BILAT WITH TOMO: ICD-10-PCS | Mod: 26,,, | Performed by: RADIOLOGY

## 2020-11-04 PROCEDURE — 77067 SCR MAMMO BI INCL CAD: CPT | Mod: TC

## 2020-11-04 PROCEDURE — 77063 BREAST TOMOSYNTHESIS BI: CPT | Mod: 26,,, | Performed by: RADIOLOGY

## 2020-11-08 NOTE — PROGRESS NOTES
[facility]    Subjective:       Patient ID: Rosemary Parkinson is a 65 y.o. female.    Chief Complaint: Establish Care      History was obtained from the patient and supplemented through chart review.  This patient been seen by an [facility] internal medicine physician but is new to me.    HPI:    Patient is a 65 y.o. female with chronic medical problems including hypothyroidism and osteoperosis (declined treatment in 2018/2019) presents to establish care since [facility] closer to  than [facility].    Doing well.  No complaints.    4 kids- stays busy  Diet: tried to maintain vegetables, fiber, omega 3, busy with kids  Keeps calories between 10-12 and 5-6 in day      Medical History  Past Medical History:   Diagnosis Date    Hx of adenomatous colonic polyps     Hypothyroid     Lactose intolerance in adult        Review of Systems   Constitutional: Negative for appetite change, diaphoresis and fever.   HENT: Negative for rhinorrhea and trouble swallowing.    Respiratory: Negative for shortness of breath.    Cardiovascular: Negative for chest pain.   Gastrointestinal: Negative for diarrhea, nausea and vomiting.   Genitourinary: Negative for hematuria.   Musculoskeletal: Negative for gait problem and myalgias.   Neurological: Negative for dizziness, seizures and weakness.   Psychiatric/Behavioral: Negative for hallucinations. The patient is not nervous/anxious.          Surgical hx, family hx, social hx   Family hx of colon cancer    Have been reviewed      Current Outpatient Medications:     levothyroxine (SYNTHROID) 75 MCG tablet, Take 1 tablet (75 mcg total) by mouth once daily., Disp: 90 tablet, Rfl: 0    MULTIVIT-IRON-MIN-FOLIC ACID 3,500-18-0.4 UNIT-MG-MG ORAL CHEW, Take 1 tablet by mouth once daily at 6am., Disp: , Rfl:     flu vac [id number] 36mos up,PF, 60 mcg (15 mcg x 4)/0.5 mL Syrg, Inject into the muscle (Patient not taking: Reported on 10/22/2019), Disp: 0.5 mL, Rfl: 0    TAZORAC 0.1 %  "cream, , Disp: , Rfl:     Objective:        Body mass index is 20.25 kg/m².  Vitals:    11/09/20 1142   BP: 123/64   Pulse: (!) 55   SpO2: 96%   Weight: 60.4 kg (133 lb 2.5 oz)   Height: 5' 8" (1.727 m)   PainSc: 0-No pain     Physical Exam  Vitals signs and nursing note reviewed.   Constitutional:       General: She is not in acute distress.     Appearance: Normal appearance. She is well-developed. She is not diaphoretic.   HENT:      Head: Normocephalic and atraumatic.   Eyes:      General: No scleral icterus.  Neck:      Musculoskeletal: Normal range of motion and neck supple.      Vascular: No JVD.   Cardiovascular:      Rate and Rhythm: Normal rate and regular rhythm.      Heart sounds: Normal heart sounds. No murmur.   Pulmonary:      Effort: Pulmonary effort is normal. No respiratory distress.      Breath sounds: Normal breath sounds. No wheezing or rales.   Abdominal:      General: There is no distension.      Palpations: Abdomen is soft. There is no mass.      Tenderness: There is no abdominal tenderness.   Musculoskeletal: Normal range of motion.   Skin:     General: Skin is warm and dry.      Capillary Refill: Capillary refill takes less than 2 seconds.   Neurological:      Mental Status: She is alert and oriented to person, place, and time.      Cranial Nerves: No cranial nerve deficit.   Psychiatric:         Behavior: Behavior normal.           Lab Results   Component Value Date    WBC 4.15 10/17/2019    HGB 13.5 10/17/2019    HCT 41.6 10/17/2019     10/17/2019    CHOL 217 (H) 07/02/2018    TRIG 43 07/02/2018    HDL 68 07/02/2018    ALT 14 10/17/2019    AST 18 10/17/2019     10/17/2019    K 4.5 10/17/2019     10/17/2019    CREATININE 0.8 10/17/2019    BUN 13 10/17/2019    CO2 28 10/17/2019    TSH 5.628 (H) 10/17/2019    HGBA1C 5.1 06/08/2017       The 10-year ASCVD risk score (Irena MARTINEZ Jr., et al., 2013) is: 4.9%    Values used to calculate the score:      Age: 65 years      Sex: " Female      Is Non- : No      Diabetic: No      Tobacco smoker: No      Systolic Blood Pressure: 123 mmHg      Is BP treated: No      HDL Cholesterol: 68 mg/dL      Total Cholesterol: 217 mg/dL    (Imaging have been independently reviewed)  Mammo and DEXA normal    Assessment:         1. Hypothyroidism, unspecified type    2. Osteoporosis, unspecified osteoporosis type, unspecified pathological fracture presence    3. Family history of colon cancer    4. Preventative health care          Plan:     Rosemary was seen today for establish care.    Diagnoses and all orders for this visit:    Hypothyroidism, unspecified type  Comments:  On 75 mcg, repeat tsh  asymptomatic    Osteoporosis, unspecified osteoporosis type, unspecified pathological fracture presence  Comments:  Refer to endocrinology to weigh options  Orders:  -     TSH; Future  -     Ambulatory referral/consult to Endocrinology; Future    Family history of colon cancer    Preventative health care  -     HIV 1/2 Ag/Ab (4th Gen); Future  -     CBC Auto Differential; Future  -     Comprehensive Metabolic Panel; Future  -     Hepatitis C Antibody; Future  -     Lipid Panel; Future    Other orders  -     Cancel: Ambulatory referral/consult to Gastroenterology; Future  -     Cancel: Case request GI: COLONOSCOPY  -     Cancel: Ambulatory referral/consult to Obstetrics / Gynecology; Future  -     Cancel: Hemoglobin A1C; Future        Health Maintenance  - Lipids: ordered  - A1C: fasting glucose  - Colon Ca Screen: -c-scope 2016, repeat in 5 years; repeat 2021  - Immunizations: shingles, PNA, influenza today    Women's health  - Pap: s/p hyst for bleeding  - Mammo: BIRADS 1 11/4/2020  - Dexa: osteoporosis Oct 2019  - Contraception: s/p hyst     Follow up in about 1 year (around 11/9/2021).        All medications were reviewed including potential side effects and risks/benefits.  Pt was counseled to call back if anything worsens or if questions  arise.    Michael Carrion MD  Family Medicine  [facility]  [address] [facility] [address] [place] [address], [place] [address]  Phone [phone]  Fax [phone]

## 2020-11-09 ENCOUNTER — OFFICE VISIT (OUTPATIENT)
Dept: INTERNAL MEDICINE | Facility: CLINIC | Age: 65
End: 2020-11-09
Payer: COMMERCIAL

## 2020-11-09 VITALS
HEART RATE: 55 BPM | HEIGHT: 68 IN | BODY MASS INDEX: 20.18 KG/M2 | DIASTOLIC BLOOD PRESSURE: 64 MMHG | OXYGEN SATURATION: 96 % | WEIGHT: 133.19 LBS | SYSTOLIC BLOOD PRESSURE: 123 MMHG

## 2020-11-09 DIAGNOSIS — Z00.00 PREVENTATIVE HEALTH CARE: ICD-10-CM

## 2020-11-09 DIAGNOSIS — M81.0 OSTEOPOROSIS, UNSPECIFIED OSTEOPOROSIS TYPE, UNSPECIFIED PATHOLOGICAL FRACTURE PRESENCE: ICD-10-CM

## 2020-11-09 DIAGNOSIS — Z80.0 FAMILY HISTORY OF COLON CANCER: ICD-10-CM

## 2020-11-09 DIAGNOSIS — E03.9 HYPOTHYROIDISM, UNSPECIFIED TYPE: Primary | ICD-10-CM

## 2020-11-09 PROCEDURE — 99999 PR PBB SHADOW E&M-EST. PATIENT-LVL V: CPT | Mod: PBBFAC,,, | Performed by: STUDENT IN AN ORGANIZED HEALTH CARE EDUCATION/TRAINING PROGRAM

## 2020-11-09 PROCEDURE — 99397 PER PM REEVAL EST PAT 65+ YR: CPT | Mod: S$GLB,,, | Performed by: STUDENT IN AN ORGANIZED HEALTH CARE EDUCATION/TRAINING PROGRAM

## 2020-11-09 PROCEDURE — 99397 PR PREVENTIVE VISIT,EST,65 & OVER: ICD-10-PCS | Mod: S$GLB,,, | Performed by: STUDENT IN AN ORGANIZED HEALTH CARE EDUCATION/TRAINING PROGRAM

## 2020-11-09 PROCEDURE — 99999 PR PBB SHADOW E&M-EST. PATIENT-LVL V: ICD-10-PCS | Mod: PBBFAC,,, | Performed by: STUDENT IN AN ORGANIZED HEALTH CARE EDUCATION/TRAINING PROGRAM

## 2020-11-10 ENCOUNTER — TELEPHONE (OUTPATIENT)
Dept: INTERNAL MEDICINE | Facility: CLINIC | Age: 65
End: 2020-11-10

## 2020-12-30 ENCOUNTER — PATIENT OUTREACH (OUTPATIENT)
Dept: ADMINISTRATIVE | Facility: OTHER | Age: 65
End: 2020-12-30

## 2020-12-31 NOTE — PROGRESS NOTES
Health Maintenance Due   Topic Date Due    HIV Screening  03/13/1970    Shingles Vaccine (2 of 2) 12/17/2019    Pneumococcal Vaccine (65+ Low/Medium Risk) (1 of 2 - PCV13) 03/13/2020    Influenza Vaccine (1) 08/01/2020     Updates were requested from care everywhere.  Chart was reviewed for overdue Proactive [facility] Encounters ([facility]) topics (CRS, Breast Cancer Screening, Eye exam)  Health Maintenance has been updated.  LINKS immunization registry triggered.  Immunizations were reconciled.

## 2021-01-04 ENCOUNTER — LAB VISIT (OUTPATIENT)
Dept: LAB | Facility: OTHER | Age: 66
End: 2021-01-04
Attending: INTERNAL MEDICINE
Payer: COMMERCIAL

## 2021-01-04 DIAGNOSIS — Z00.00 PREVENTATIVE HEALTH CARE: ICD-10-CM

## 2021-01-04 DIAGNOSIS — E03.9 HYPOTHYROIDISM, UNSPECIFIED TYPE: ICD-10-CM

## 2021-01-04 DIAGNOSIS — M81.0 OSTEOPOROSIS, UNSPECIFIED OSTEOPOROSIS TYPE, UNSPECIFIED PATHOLOGICAL FRACTURE PRESENCE: ICD-10-CM

## 2021-01-04 LAB
ALBUMIN SERPL BCP-MCNC: 4.1 G/DL (ref 3.5–5.2)
ALP SERPL-CCNC: 59 U/L (ref 55–135)
ALT SERPL W/O P-5'-P-CCNC: 15 U/L (ref 10–44)
ANION GAP SERPL CALC-SCNC: 11 MMOL/L (ref 8–16)
AST SERPL-CCNC: 21 U/L (ref 10–40)
BASOPHILS # BLD AUTO: 0.03 K/UL (ref 0–0.2)
BASOPHILS NFR BLD: 0.7 % (ref 0–1.9)
BILIRUB SERPL-MCNC: 0.6 MG/DL (ref 0.1–1)
BUN SERPL-MCNC: 16 MG/DL (ref 8–23)
CALCIUM SERPL-MCNC: 8.8 MG/DL (ref 8.7–10.5)
CHLORIDE SERPL-SCNC: 106 MMOL/L (ref 95–110)
CHOLEST SERPL-MCNC: 221 MG/DL (ref 120–199)
CHOLEST/HDLC SERPL: 3.3 {RATIO} (ref 2–5)
CO2 SERPL-SCNC: 26 MMOL/L (ref 23–29)
CREAT SERPL-MCNC: 0.7 MG/DL (ref 0.5–1.4)
DIFFERENTIAL METHOD: ABNORMAL
EOSINOPHIL # BLD AUTO: 0.1 K/UL (ref 0–0.5)
EOSINOPHIL NFR BLD: 1.4 % (ref 0–8)
ERYTHROCYTE [DISTWIDTH] IN BLOOD BY AUTOMATED COUNT: 13.2 % (ref 11.5–14.5)
EST. GFR  (AFRICAN AMERICAN): >60 ML/MIN/1.73 M^2
EST. GFR  (NON AFRICAN AMERICAN): >60 ML/MIN/1.73 M^2
GLUCOSE SERPL-MCNC: 81 MG/DL (ref 70–110)
HCT VFR BLD AUTO: 41.9 % (ref 37–48.5)
HDLC SERPL-MCNC: 68 MG/DL (ref 40–75)
HDLC SERPL: 30.8 % (ref 20–50)
HGB BLD-MCNC: 13.2 G/DL (ref 12–16)
IMM GRANULOCYTES # BLD AUTO: 0 K/UL (ref 0–0.04)
IMM GRANULOCYTES NFR BLD AUTO: 0 % (ref 0–0.5)
LDLC SERPL CALC-MCNC: 140.6 MG/DL (ref 63–159)
LYMPHOCYTES # BLD AUTO: 1.8 K/UL (ref 1–4.8)
LYMPHOCYTES NFR BLD: 41.1 % (ref 18–48)
MCH RBC QN AUTO: 29.5 PG (ref 27–31)
MCHC RBC AUTO-ENTMCNC: 31.5 G/DL (ref 32–36)
MCV RBC AUTO: 94 FL (ref 82–98)
MONOCYTES # BLD AUTO: 0.3 K/UL (ref 0.3–1)
MONOCYTES NFR BLD: 7.8 % (ref 4–15)
NEUTROPHILS # BLD AUTO: 2.1 K/UL (ref 1.8–7.7)
NEUTROPHILS NFR BLD: 49 % (ref 38–73)
NONHDLC SERPL-MCNC: 153 MG/DL
NRBC BLD-RTO: 0 /100 WBC
PLATELET # BLD AUTO: 211 K/UL (ref 150–350)
PMV BLD AUTO: 10.7 FL (ref 9.2–12.9)
POTASSIUM SERPL-SCNC: 4.5 MMOL/L (ref 3.5–5.1)
PROT SERPL-MCNC: 6.9 G/DL (ref 6–8.4)
RBC # BLD AUTO: 4.48 M/UL (ref 4–5.4)
SODIUM SERPL-SCNC: 143 MMOL/L (ref 136–145)
TRIGL SERPL-MCNC: 62 MG/DL (ref 30–150)
TSH SERPL DL<=0.005 MIU/L-ACNC: 2.54 UIU/ML (ref 0.4–4)
TSH SERPL DL<=0.005 MIU/L-ACNC: 2.54 UIU/ML (ref 0.4–4)
WBC # BLD AUTO: 4.35 K/UL (ref 3.9–12.7)

## 2021-01-04 PROCEDURE — 36415 COLL VENOUS BLD VENIPUNCTURE: CPT

## 2021-01-04 PROCEDURE — 80061 LIPID PANEL: CPT

## 2021-01-04 PROCEDURE — 86703 HIV-1/HIV-2 1 RESULT ANTBDY: CPT

## 2021-01-04 PROCEDURE — 86803 HEPATITIS C AB TEST: CPT

## 2021-01-04 PROCEDURE — 85025 COMPLETE CBC W/AUTO DIFF WBC: CPT

## 2021-01-04 PROCEDURE — 84443 ASSAY THYROID STIM HORMONE: CPT

## 2021-01-04 PROCEDURE — 80053 COMPREHEN METABOLIC PANEL: CPT

## 2021-01-05 LAB
HCV AB SERPL QL IA: NEGATIVE
HIV 1+2 AB+HIV1 P24 AG SERPL QL IA: NEGATIVE

## 2021-01-06 ENCOUNTER — OFFICE VISIT (OUTPATIENT)
Dept: ENDOCRINOLOGY | Facility: CLINIC | Age: 66
End: 2021-01-06
Payer: COMMERCIAL

## 2021-01-06 VITALS
HEART RATE: 54 BPM | HEIGHT: 68 IN | BODY MASS INDEX: 20.78 KG/M2 | DIASTOLIC BLOOD PRESSURE: 56 MMHG | OXYGEN SATURATION: 98 % | SYSTOLIC BLOOD PRESSURE: 116 MMHG | WEIGHT: 137.13 LBS

## 2021-01-06 DIAGNOSIS — E78.00 PURE HYPERCHOLESTEROLEMIA: ICD-10-CM

## 2021-01-06 DIAGNOSIS — E03.9 HYPOTHYROIDISM, UNSPECIFIED TYPE: Primary | ICD-10-CM

## 2021-01-06 DIAGNOSIS — M81.0 AGE-RELATED OSTEOPOROSIS WITHOUT CURRENT PATHOLOGICAL FRACTURE: ICD-10-CM

## 2021-01-06 PROCEDURE — 3288F FALL RISK ASSESSMENT DOCD: CPT | Mod: CPTII,S$GLB,, | Performed by: INTERNAL MEDICINE

## 2021-01-06 PROCEDURE — 99204 OFFICE O/P NEW MOD 45 MIN: CPT | Mod: S$GLB,,, | Performed by: INTERNAL MEDICINE

## 2021-01-06 PROCEDURE — 3288F PR FALLS RISK ASSESSMENT DOCUMENTED: ICD-10-PCS | Mod: CPTII,S$GLB,, | Performed by: INTERNAL MEDICINE

## 2021-01-06 PROCEDURE — 3008F BODY MASS INDEX DOCD: CPT | Mod: CPTII,S$GLB,, | Performed by: INTERNAL MEDICINE

## 2021-01-06 PROCEDURE — 1101F PT FALLS ASSESS-DOCD LE1/YR: CPT | Mod: CPTII,S$GLB,, | Performed by: INTERNAL MEDICINE

## 2021-01-06 PROCEDURE — 1101F PR PT FALLS ASSESS DOC 0-1 FALLS W/OUT INJ PAST YR: ICD-10-PCS | Mod: CPTII,S$GLB,, | Performed by: INTERNAL MEDICINE

## 2021-01-06 PROCEDURE — 3008F PR BODY MASS INDEX (BMI) DOCUMENTED: ICD-10-PCS | Mod: CPTII,S$GLB,, | Performed by: INTERNAL MEDICINE

## 2021-01-06 PROCEDURE — 99204 PR OFFICE/OUTPT VISIT, NEW, LEVL IV, 45-59 MIN: ICD-10-PCS | Mod: S$GLB,,, | Performed by: INTERNAL MEDICINE

## 2021-01-06 PROCEDURE — 1126F PR PAIN SEVERITY QUANTIFIED, NO PAIN PRESENT: ICD-10-PCS | Mod: S$GLB,,, | Performed by: INTERNAL MEDICINE

## 2021-01-06 PROCEDURE — 1126F AMNT PAIN NOTED NONE PRSNT: CPT | Mod: S$GLB,,, | Performed by: INTERNAL MEDICINE

## 2021-01-06 RX ORDER — ALENDRONATE SODIUM 70 MG/1
70 TABLET ORAL
Qty: 12 TABLET | Refills: 3 | Status: SHIPPED | OUTPATIENT
Start: 2021-01-06 | End: 2022-02-10 | Stop reason: SDUPTHER

## 2021-01-06 RX ORDER — LEVOTHYROXINE SODIUM 75 UG/1
75 TABLET ORAL DAILY
Qty: 90 TABLET | Refills: 3 | Status: SHIPPED | OUTPATIENT
Start: 2021-01-06 | End: 2022-02-19 | Stop reason: SDUPTHER

## 2021-03-08 ENCOUNTER — PATIENT MESSAGE (OUTPATIENT)
Dept: INTERNAL MEDICINE | Facility: CLINIC | Age: 66
End: 2021-03-08

## 2021-04-26 ENCOUNTER — PATIENT MESSAGE (OUTPATIENT)
Dept: RESEARCH | Facility: HOSPITAL | Age: 66
End: 2021-04-26

## 2021-08-11 ENCOUNTER — CLINICAL SUPPORT (OUTPATIENT)
Dept: URGENT CARE | Facility: CLINIC | Age: 66
End: 2021-08-11
Payer: COMMERCIAL

## 2021-08-11 DIAGNOSIS — Z20.822 CONTACT WITH AND (SUSPECTED) EXPOSURE TO COVID-19: Primary | ICD-10-CM

## 2021-08-11 LAB
CTP QC/QA: YES
SARS-COV-2 RDRP RESP QL NAA+PROBE: NEGATIVE

## 2021-08-11 PROCEDURE — U0002: ICD-10-PCS | Mod: QW,S$GLB,, | Performed by: FAMILY MEDICINE

## 2021-08-11 PROCEDURE — 99211 PR OFFICE/OUTPT VISIT, EST, LEVL I: ICD-10-PCS | Mod: S$GLB,CS,, | Performed by: FAMILY MEDICINE

## 2021-08-11 PROCEDURE — U0002 COVID-19 LAB TEST NON-CDC: HCPCS | Mod: QW,S$GLB,, | Performed by: FAMILY MEDICINE

## 2021-08-11 PROCEDURE — 99211 OFF/OP EST MAY X REQ PHY/QHP: CPT | Mod: S$GLB,CS,, | Performed by: FAMILY MEDICINE

## 2022-02-10 DIAGNOSIS — M81.0 AGE-RELATED OSTEOPOROSIS WITHOUT CURRENT PATHOLOGICAL FRACTURE: ICD-10-CM

## 2022-02-10 RX ORDER — ALENDRONATE SODIUM 70 MG/1
70 TABLET ORAL
Qty: 12 TABLET | Refills: 0 | Status: SHIPPED | OUTPATIENT
Start: 2022-02-10 | End: 2022-10-18 | Stop reason: SDUPTHER

## 2022-02-19 DIAGNOSIS — E03.9 HYPOTHYROIDISM, UNSPECIFIED TYPE: ICD-10-CM

## 2022-02-21 RX ORDER — LEVOTHYROXINE SODIUM 75 UG/1
75 TABLET ORAL DAILY
Qty: 30 TABLET | Refills: 1 | Status: SHIPPED | OUTPATIENT
Start: 2022-02-21 | End: 2022-05-16 | Stop reason: SDUPTHER

## 2022-02-23 ENCOUNTER — PATIENT MESSAGE (OUTPATIENT)
Dept: ENDOCRINOLOGY | Facility: CLINIC | Age: 67
End: 2022-02-23
Payer: COMMERCIAL

## 2022-02-23 DIAGNOSIS — Z12.31 OTHER SCREENING MAMMOGRAM: ICD-10-CM

## 2022-05-16 DIAGNOSIS — E03.9 HYPOTHYROIDISM, UNSPECIFIED TYPE: ICD-10-CM

## 2022-05-16 RX ORDER — LEVOTHYROXINE SODIUM 75 UG/1
75 TABLET ORAL DAILY
Qty: 30 TABLET | Refills: 1 | Status: SHIPPED | OUTPATIENT
Start: 2022-05-16 | End: 2022-07-25 | Stop reason: SDUPTHER

## 2022-05-17 ENCOUNTER — PATIENT MESSAGE (OUTPATIENT)
Dept: ENDOCRINOLOGY | Facility: CLINIC | Age: 67
End: 2022-05-17
Payer: COMMERCIAL

## 2022-05-20 ENCOUNTER — APPOINTMENT (OUTPATIENT)
Dept: RADIOLOGY | Facility: OTHER | Age: 67
End: 2022-05-20
Attending: STUDENT IN AN ORGANIZED HEALTH CARE EDUCATION/TRAINING PROGRAM
Payer: COMMERCIAL

## 2022-05-20 VITALS — WEIGHT: 137.13 LBS | BODY MASS INDEX: 20.78 KG/M2 | HEIGHT: 68 IN

## 2022-05-20 DIAGNOSIS — Z12.31 OTHER SCREENING MAMMOGRAM: ICD-10-CM

## 2022-05-20 PROCEDURE — 77063 BREAST TOMOSYNTHESIS BI: CPT | Mod: 26,,, | Performed by: RADIOLOGY

## 2022-05-20 PROCEDURE — 77063 MAMMO DIGITAL SCREENING BILAT WITH TOMO: ICD-10-PCS | Mod: 26,,, | Performed by: RADIOLOGY

## 2022-05-20 PROCEDURE — 77067 SCR MAMMO BI INCL CAD: CPT | Mod: 26,,, | Performed by: RADIOLOGY

## 2022-05-20 PROCEDURE — 77067 MAMMO DIGITAL SCREENING BILAT WITH TOMO: ICD-10-PCS | Mod: 26,,, | Performed by: RADIOLOGY

## 2022-05-20 PROCEDURE — 77063 BREAST TOMOSYNTHESIS BI: CPT | Mod: TC,PN

## 2022-05-20 PROCEDURE — 77067 SCR MAMMO BI INCL CAD: CPT | Mod: TC,PN

## 2022-05-23 ENCOUNTER — PATIENT MESSAGE (OUTPATIENT)
Dept: ENDOCRINOLOGY | Facility: CLINIC | Age: 67
End: 2022-05-23
Payer: COMMERCIAL

## 2022-05-23 ENCOUNTER — PATIENT MESSAGE (OUTPATIENT)
Dept: INTERNAL MEDICINE | Facility: CLINIC | Age: 67
End: 2022-05-23
Payer: COMMERCIAL

## 2022-05-23 DIAGNOSIS — Z12.11 SCREEN FOR COLON CANCER: Primary | ICD-10-CM

## 2022-05-24 ENCOUNTER — TELEPHONE (OUTPATIENT)
Dept: ENDOSCOPY | Facility: HOSPITAL | Age: 67
End: 2022-05-24
Payer: COMMERCIAL

## 2022-05-24 ENCOUNTER — TELEPHONE (OUTPATIENT)
Dept: ENDOCRINOLOGY | Facility: CLINIC | Age: 67
End: 2022-05-24
Payer: COMMERCIAL

## 2022-05-25 ENCOUNTER — TELEPHONE (OUTPATIENT)
Dept: ENDOSCOPY | Facility: HOSPITAL | Age: 67
End: 2022-05-25
Payer: COMMERCIAL

## 2022-05-25 ENCOUNTER — TELEPHONE (OUTPATIENT)
Dept: INTERNAL MEDICINE | Facility: CLINIC | Age: 67
End: 2022-05-25
Payer: COMMERCIAL

## 2022-05-25 ENCOUNTER — PATIENT MESSAGE (OUTPATIENT)
Dept: ENDOCRINOLOGY | Facility: CLINIC | Age: 67
End: 2022-05-25
Payer: COMMERCIAL

## 2022-05-25 DIAGNOSIS — Z00.00 PREVENTATIVE HEALTH CARE: Primary | ICD-10-CM

## 2022-05-25 DIAGNOSIS — Z12.11 SPECIAL SCREENING FOR MALIGNANT NEOPLASMS, COLON: Primary | ICD-10-CM

## 2022-05-25 RX ORDER — SODIUM, POTASSIUM,MAG SULFATES 17.5-3.13G
SOLUTION, RECONSTITUTED, ORAL ORAL
Qty: 1 KIT | Refills: 0 | Status: SHIPPED | OUTPATIENT
Start: 2022-05-25

## 2022-05-25 NOTE — TELEPHONE ENCOUNTER
----- Message from Maribel Ochoa sent at 5/25/2022  9:55 AM CDT -----  Regarding: self  .Type: Lab    Caller is requesting to schedule their Lab appointment prior to annual appointment.    Order is not listed in EPIC.  Please enter order and contact patient to schedule.    Name of Caller self     Preferred Date and Time of Labs: anytime     Date of EPP Appointment: 08/10    Where would they like the lab performed [facility]    Would the patient rather a call back or a response via [facility]?  Call back     Best Call Back Number: .[phone]

## 2022-05-26 ENCOUNTER — TELEPHONE (OUTPATIENT)
Dept: GASTROENTEROLOGY | Facility: CLINIC | Age: 67
End: 2022-05-26
Payer: COMMERCIAL

## 2022-05-26 ENCOUNTER — PATIENT MESSAGE (OUTPATIENT)
Dept: ENDOSCOPY | Facility: HOSPITAL | Age: 67
End: 2022-05-26
Payer: COMMERCIAL

## 2022-05-26 NOTE — TELEPHONE ENCOUNTER
----- Message from Phong Alan sent at 5/26/2022 11:28 AM CDT -----  Contact: @[phone]  Patient calling to the Colonoscopy prep sent to:     [address] [address]  Phone: [phone] Fax: [phone]

## 2022-06-01 ENCOUNTER — ANESTHESIA EVENT (OUTPATIENT)
Dept: ENDOSCOPY | Facility: HOSPITAL | Age: 67
End: 2022-06-01
Payer: COMMERCIAL

## 2022-06-01 ENCOUNTER — ANESTHESIA (OUTPATIENT)
Dept: ENDOSCOPY | Facility: HOSPITAL | Age: 67
End: 2022-06-01
Payer: COMMERCIAL

## 2022-06-01 ENCOUNTER — HOSPITAL ENCOUNTER (OUTPATIENT)
Facility: HOSPITAL | Age: 67
Discharge: HOME | End: 2022-06-01
Attending: INTERNAL MEDICINE | Admitting: INTERNAL MEDICINE
Payer: COMMERCIAL

## 2022-06-01 VITALS
WEIGHT: 134 LBS | RESPIRATION RATE: 20 BRPM | SYSTOLIC BLOOD PRESSURE: 108 MMHG | HEIGHT: 68 IN | OXYGEN SATURATION: 94 % | HEART RATE: 54 BPM | BODY MASS INDEX: 20.31 KG/M2 | DIASTOLIC BLOOD PRESSURE: 53 MMHG | TEMPERATURE: 99 F

## 2022-06-01 DIAGNOSIS — Z86.010 HISTORY OF COLON POLYPS: ICD-10-CM

## 2022-06-01 PROCEDURE — 63600175 PHARM REV CODE 636 W HCPCS: Performed by: NURSE ANESTHETIST, CERTIFIED REGISTERED

## 2022-06-01 PROCEDURE — 37000008 HC ANESTHESIA 1ST 15 MINUTES: Performed by: INTERNAL MEDICINE

## 2022-06-01 PROCEDURE — 88305 TISSUE EXAM BY PATHOLOGIST: CPT | Mod: 26,,, | Performed by: PATHOLOGY

## 2022-06-01 PROCEDURE — 37000009 HC ANESTHESIA EA ADD 15 MINS: Performed by: INTERNAL MEDICINE

## 2022-06-01 PROCEDURE — E9220 PRA ENDO ANESTHESIA: HCPCS | Mod: 33,,, | Performed by: NURSE ANESTHETIST, CERTIFIED REGISTERED

## 2022-06-01 PROCEDURE — 88305 TISSUE EXAM BY PATHOLOGIST: CPT | Performed by: PATHOLOGY

## 2022-06-01 PROCEDURE — 45385 COLONOSCOPY W/LESION REMOVAL: CPT | Mod: 33,,, | Performed by: INTERNAL MEDICINE

## 2022-06-01 PROCEDURE — 45385 PR COLONOSCOPY,REMV LESN,SNARE: ICD-10-PCS | Mod: 33,,, | Performed by: INTERNAL MEDICINE

## 2022-06-01 PROCEDURE — 25000003 PHARM REV CODE 250: Performed by: INTERNAL MEDICINE

## 2022-06-01 PROCEDURE — 25000003 PHARM REV CODE 250: Performed by: NURSE ANESTHETIST, CERTIFIED REGISTERED

## 2022-06-01 PROCEDURE — 27201089 HC SNARE, DISP (ANY): Performed by: INTERNAL MEDICINE

## 2022-06-01 PROCEDURE — 88305 TISSUE EXAM BY PATHOLOGIST: ICD-10-PCS | Mod: 26,,, | Performed by: PATHOLOGY

## 2022-06-01 PROCEDURE — E9220 PRA ENDO ANESTHESIA: ICD-10-PCS | Mod: 33,,, | Performed by: NURSE ANESTHETIST, CERTIFIED REGISTERED

## 2022-06-01 PROCEDURE — 45385 COLONOSCOPY W/LESION REMOVAL: CPT | Mod: PT | Performed by: INTERNAL MEDICINE

## 2022-06-01 RX ORDER — PROPOFOL 10 MG/ML
VIAL (ML) INTRAVENOUS CONTINUOUS PRN
Status: DISCONTINUED | OUTPATIENT
Start: 2022-06-01 | End: 2022-06-01

## 2022-06-01 RX ORDER — PROPOFOL 10 MG/ML
VIAL (ML) INTRAVENOUS
Status: DISCONTINUED | OUTPATIENT
Start: 2022-06-01 | End: 2022-06-01

## 2022-06-01 RX ORDER — LIDOCAINE HYDROCHLORIDE 20 MG/ML
INJECTION INTRAVENOUS
Status: DISCONTINUED | OUTPATIENT
Start: 2022-06-01 | End: 2022-06-01

## 2022-06-01 RX ORDER — SODIUM CHLORIDE 9 MG/ML
INJECTION, SOLUTION INTRAVENOUS CONTINUOUS
Status: DISCONTINUED | OUTPATIENT
Start: 2022-06-01 | End: 2022-06-01 | Stop reason: HOSPADM

## 2022-06-01 RX ADMIN — SODIUM CHLORIDE: 0.9 INJECTION, SOLUTION INTRAVENOUS at 12:06

## 2022-06-01 RX ADMIN — LIDOCAINE HYDROCHLORIDE 50 MG: 20 INJECTION, SOLUTION INTRAVENOUS at 12:06

## 2022-06-01 RX ADMIN — PROPOFOL 70 MG: 10 INJECTION, EMULSION INTRAVENOUS at 12:06

## 2022-06-01 RX ADMIN — Medication 200 MCG/KG/MIN: at 12:06

## 2022-06-01 NOTE — TRANSFER OF CARE
"Anesthesia Transfer of Care Note    Patient: Rosemary Parkinson    Procedure(s) Performed: Procedure(s) (LRB):  COLONOSCOPY (N/A)    Patient location: OPS    Anesthesia Type: general    Transport from OR: Transported from OR on room air with adequate spontaneous ventilation    Post pain: adequate analgesia    Post assessment: no apparent anesthetic complications and tolerated procedure well    Post vital signs: stable    Level of consciousness: awake, alert and oriented    Nausea/Vomiting: no nausea/vomiting    Complications: none    Transfer of care protocol was followed      Last vitals:   Visit Vitals  BP (!) 115/54 (BP Location: Left arm, Patient Position: Lying)   Pulse 67   Temp 36.6 °C (97.9 °F) (Temporal)   Resp 17   Ht 5' 8" (1.727 m)   Wt 60.8 kg (134 lb)   SpO2 96%   Breastfeeding No   BMI 20.37 kg/m²     "

## 2022-06-01 NOTE — ANESTHESIA PREPROCEDURE EVALUATION
06/01/2022  Rosemary Parkinson is a 67 y.o., female. Scheduled for a colonoscopy. History of hypothyroidism and osteoporosis.       Pre-op Assessment    I have reviewed the Patient Summary Reports.     I have reviewed the Nursing Notes. I have reviewed the NPO Status.   I have reviewed the Medications.     Review of Systems  Anesthesia Hx:   Denies Personal Hx of Anesthesia complications.   Cardiovascular:   Denies Hypertension.  Denies MI.  Denies CAD.    Denies Dysrhythmias.     Pulmonary:   Denies COPD.  Denies Asthma.  Denies Shortness of breath.  Denies Sleep Apnea.    Renal/:   Denies Chronic Renal Disease.     Hepatic/GI:   Denies Liver Disease.    Neurological:   Denies TIA. Denies CVA. Denies Seizures.    Endocrine:   Denies Diabetes. Denies Hypothyroidism. Denies Hyperthyroidism.        Physical Exam  General: Well nourished, Cooperative, Alert and Oriented    Airway:  Mallampati: I   Mouth Opening: Normal  TM Distance: Normal  Tongue: Normal  Neck ROM: Normal ROM    Dental:  Intact        Anesthesia Plan  Type of Anesthesia, risks & benefits discussed:    Anesthesia Type: Gen Natural Airway  Intra-op Monitoring Plan: Standard ASA Monitors  Post Op Pain Control Plan: multimodal analgesia  Induction:  IV  Informed Consent: Informed consent signed with the Patient and all parties understand the risks and agree with anesthesia plan.  All questions answered.   ASA Score: 2    Ready For Surgery From Anesthesia Perspective.     .

## 2022-06-01 NOTE — PROVATION PATIENT INSTRUCTIONS
Discharge Summary/Instructions after an Endoscopic Procedure  Patient Name: Rosemary Parkinson  Patient MRN: [id number]  Patient YOB: 1955  Wednesday, June 1, 2022  Mati Banks MD  Dear patient,  As a result of recent federal legislation (The Federal Cures Act), you may   receive lab or pathology results from your procedure in your [facility]   account before your physician is able to contact you. Your physician or   their representative will relay the results to you with their   recommendations at their soonest availability.  Thank you,  RESTRICTIONS:  During your procedure today, you received medications for sedation.  These   medications may affect your judgment, balance and coordination.  Therefore,   for 24 hours, you have the following restrictions:   - DO NOT drive a car, operate machinery, make legal/financial decisions,   sign important papers or drink alcohol.    ACTIVITY:  Today: no heavy lifting, straining or running due to procedural   sedation/anesthesia.  The following day: return to full activity including work.  DIET:  Eat and drink normally unless instructed otherwise.     TREATMENT FOR COMMON SIDE EFFECTS:  - Mild abdominal pain, nausea, belching, bloating or excessive gas:  rest,   eat lightly and use a heating pad.  - Sore Throat: treat with throat lozenges and/or gargle with warm salt   water.  - Because air was used during the procedure, expelling large amounts of air   from your rectum or belching is normal.  - If a bowel prep was taken, you may not have a bowel movement for 1-3 days.    This is normal.  SYMPTOMS TO WATCH FOR AND REPORT TO YOUR PHYSICIAN:  1. Abdominal pain or bloating, other than gas cramps.  2. Chest pain.  3. Back pain.  4. Signs of infection such as: chills or fever occurring within 24 hours   after the procedure.  5. Rectal bleeding, which would show as bright red, maroon, or black stools.   (A tablespoon of blood from the rectum is not serious, especially if    hemorrhoids are present.)  6. Vomiting.  7. Weakness or dizziness.  GO DIRECTLY TO THE NEAREST EMERGENCY ROOM IF YOU HAVE ANY OF THE FOLLOWING:      Difficulty breathing              Chills and/or fever over 101 F   Persistent vomiting and/or vomiting blood   Severe abdominal pain   Severe chest pain   Black, tarry stools   Bleeding- more than one tablespoon   Any other symptom or condition that you feel may need urgent attention  Your doctor recommends these additional instructions:  If any biopsies were taken, your doctors clinic will contact you in 1 to 2   weeks with any results.  - Discharge patient to home.   - Await pathology results.   - Telephone endoscopist for pathology results in 3 weeks.   - Repeat colonoscopy in 3 - 5 years for surveillance based on pathology   results.   - For the next 2 weeks only - Consider avoiding all non-steroidal   anti-inflammatory drugs (mobic, ibuprofen, naproxen, etc.), unless needed   for cardiovascular protection. O.K. to take aspirin if needed for   cardiovascular protection.  - Return to referring physician.   - The findings and recommendations were discussed with the patient.  For questions, problems or results please call your physician - Mati Banks MD at Work:  [phone].  [facility], EMERGENCY ROOM PHONE NUMBER: [phone]  IF A COMPLICATION OR EMERGENCY SITUATION ARISES AND YOU ARE UNABLE TO REACH   YOUR PHYSICIAN - GO DIRECTLY TO THE EMERGENCY ROOM.  Mati Banks MD  6/1/2022 1:26:34 PM  This report has been verified and signed electronically.  Dear patient,  As a result of recent federal legislation (The Federal Cures Act), you may   receive lab or pathology results from your procedure in your [facility]   account before your physician is able to contact you. Your physician or   their representative will relay the results to you with their   recommendations at their soonest availability.  Thank you,  PROVATION

## 2022-06-01 NOTE — ANESTHESIA POSTPROCEDURE EVALUATION
Anesthesia Post Evaluation    Patient: Rosemary Parkinson    Procedure(s) Performed: Procedure(s) (LRB):  COLONOSCOPY (N/A)    Final Anesthesia Type: general      Patient location during evaluation: PACU  Patient participation: Yes- Able to Participate  Level of consciousness: awake and alert  Post-procedure vital signs: reviewed and stable  Pain management: adequate  Airway patency: patent    PONV status at discharge: No PONV  Anesthetic complications: no      Cardiovascular status: blood pressure returned to baseline  Respiratory status: unassisted and spontaneous ventilation  Hydration status: euvolemic  Follow-up not needed.          Vitals Value Taken Time   /53 06/01/22 1352   Temp 37.1 °C (98.7 °F) 06/01/22 1323   Pulse 54 06/01/22 1352   Resp 20 06/01/22 1352   SpO2 94 % 06/01/22 1352         Event Time   Out of Recovery 14:08:24         Pain/Sherron Score: Sherron Score: 10 (6/1/2022  1:52 PM)

## 2022-06-01 NOTE — H&P
[facility]  History & Physical    Subjective:      Chief Complaint/Reason for Admission:     Colonscopy    Rosemary Parkinson is a 67 y.o. female.    Past Medical History:   Diagnosis Date    Hx of adenomatous colonic polyps     Hypothyroid     Lactose intolerance in adult     Osteoporosis      Past Surgical History:   Procedure Laterality Date    APPENDECTOMY       SECTION      COLONOSCOPY N/A 2016    Procedure: COLONOSCOPY;  Surgeon: Mati Banks MD;  Location: [facility] ([address] [place] [address]);  Service: Endoscopy;  Laterality: N/A;    HYSTERECTOMY      TONSILLECTOMY, ADENOIDECTOMY      TOTAL ABDOMINAL HYSTERECTOMY W/ BILATERAL SALPINGOOPHORECTOMY       Family History   Problem Relation Age of Onset    Cancer Father         colon    Glaucoma Father     Cancer Maternal Grandmother 70        colon    Glaucoma Brother     Osteoporosis Neg Hx      Social History     Tobacco Use    Smoking status: Never Smoker    Smokeless tobacco: Never Used    Tobacco comment: in college   Substance Use Topics    Alcohol use: Yes     Alcohol/week: 2.0 standard drinks     Types: 2 Glasses of wine per week     Comment: social     Drug use: No       PTA Medications   Medication Sig    alendronate (FOSAMAX) 70 MG tablet Take 1 tablet (70 mg total) by mouth every 7 days. Take with a full glass of water & remain upright for at least 30 minutes. Last appointment 2021. Follow-up needed before additional refills can be approved.    levothyroxine (SYNTHROID) 75 MCG tablet Take 1 tablet (75 mcg total) by mouth once daily. Last appointment 2021. Follow-up needed to approve additional refills.    MULTIVIT-IRON-MIN-FOLIC ACID 3,500-18-0.4 UNIT-MG-MG ORAL CHEW Take 1 tablet by mouth once daily at 6am.    sodium,potassium,mag sulfates (SUPREP BOWEL PREP KIT) 17.5-3.13-1.6 gram SolR As Directed    flu vac [id number] 36mos up,PF, 60 mcg (15 mcg x 4)/0.5 mL Syrg Inject into the muscle (Patient not  taking: Reported on 10/22/2019)    TAZORAC 0.1 % cream      Review of patient's allergies indicates:   Allergen Reactions    Demerol [meperidine] Other (See Comments)     dysphoria        Review of Systems   Constitutional: Negative for chills and fever.   Respiratory: Negative for shortness of breath.    Cardiovascular: Negative for chest pain.   Gastrointestinal: Negative for abdominal pain.       Objective:      Vital Signs (Most Recent)  Temp: 97.9 °F (36.6 °C) (06/01/22 1153)  Pulse: 67 (06/01/22 1153)  Resp: 17 (06/01/22 1153)  BP: (!) 115/54 (06/01/22 1153)  SpO2: 96 % (06/01/22 1153)    Vital Signs Range (Last 24H):  Temp:  [97.9 °F (36.6 °C)]   Pulse:  [67]   Resp:  [17]   BP: (115)/(54)   SpO2:  [96 %]     Physical Exam  Constitutional:       Appearance: Normal appearance.   Cardiovascular:      Rate and Rhythm: Normal rate.   Pulmonary:      Effort: Pulmonary effort is normal.   Neurological:      Mental Status: She is alert and oriented to person, place, and time.   Psychiatric:         Mood and Affect: Mood normal.         Behavior: Behavior normal.         Thought Content: Thought content normal.         Judgment: Judgment normal.           Assessment:      There are no hospital problems to display for this patient.      Plan:    Surveillance colonoscopy history of colon adenoma and Family history of CRC

## 2022-06-01 NOTE — PLAN OF CARE
Procedure completed. Pt discharged with cousin. In no acute distress. Discharge instructions given. Verbalizes understanding of post procedure restrictions and instructions.

## 2022-06-03 ENCOUNTER — OFFICE VISIT (OUTPATIENT)
Dept: ENDOCRINOLOGY | Facility: CLINIC | Age: 67
End: 2022-06-03
Payer: COMMERCIAL

## 2022-06-03 VITALS
SYSTOLIC BLOOD PRESSURE: 116 MMHG | DIASTOLIC BLOOD PRESSURE: 56 MMHG | HEART RATE: 58 BPM | HEIGHT: 68 IN | WEIGHT: 134 LBS | BODY MASS INDEX: 20.31 KG/M2

## 2022-06-03 DIAGNOSIS — E03.9 HYPOTHYROIDISM, UNSPECIFIED TYPE: ICD-10-CM

## 2022-06-03 DIAGNOSIS — M81.0 AGE-RELATED OSTEOPOROSIS WITHOUT CURRENT PATHOLOGICAL FRACTURE: Primary | ICD-10-CM

## 2022-06-03 PROCEDURE — 3288F FALL RISK ASSESSMENT DOCD: CPT | Mod: CPTII,S$GLB,, | Performed by: INTERNAL MEDICINE

## 2022-06-03 PROCEDURE — 1126F PR PAIN SEVERITY QUANTIFIED, NO PAIN PRESENT: ICD-10-PCS | Mod: CPTII,S$GLB,, | Performed by: INTERNAL MEDICINE

## 2022-06-03 PROCEDURE — 1159F MED LIST DOCD IN RCRD: CPT | Mod: CPTII,S$GLB,, | Performed by: INTERNAL MEDICINE

## 2022-06-03 PROCEDURE — 3074F SYST BP LT 130 MM HG: CPT | Mod: CPTII,S$GLB,, | Performed by: INTERNAL MEDICINE

## 2022-06-03 PROCEDURE — 3288F PR FALLS RISK ASSESSMENT DOCUMENTED: ICD-10-PCS | Mod: CPTII,S$GLB,, | Performed by: INTERNAL MEDICINE

## 2022-06-03 PROCEDURE — 99214 PR OFFICE/OUTPT VISIT, EST, LEVL IV, 30-39 MIN: ICD-10-PCS | Mod: S$GLB,,, | Performed by: INTERNAL MEDICINE

## 2022-06-03 PROCEDURE — 3008F BODY MASS INDEX DOCD: CPT | Mod: CPTII,S$GLB,, | Performed by: INTERNAL MEDICINE

## 2022-06-03 PROCEDURE — 1159F PR MEDICATION LIST DOCUMENTED IN MEDICAL RECORD: ICD-10-PCS | Mod: CPTII,S$GLB,, | Performed by: INTERNAL MEDICINE

## 2022-06-03 PROCEDURE — 1160F PR REVIEW ALL MEDS BY PRESCRIBER/CLIN PHARMACIST DOCUMENTED: ICD-10-PCS | Mod: CPTII,S$GLB,, | Performed by: INTERNAL MEDICINE

## 2022-06-03 PROCEDURE — 1160F RVW MEDS BY RX/DR IN RCRD: CPT | Mod: CPTII,S$GLB,, | Performed by: INTERNAL MEDICINE

## 2022-06-03 PROCEDURE — 1126F AMNT PAIN NOTED NONE PRSNT: CPT | Mod: CPTII,S$GLB,, | Performed by: INTERNAL MEDICINE

## 2022-06-03 PROCEDURE — 3074F PR MOST RECENT SYSTOLIC BLOOD PRESSURE < 130 MM HG: ICD-10-PCS | Mod: CPTII,S$GLB,, | Performed by: INTERNAL MEDICINE

## 2022-06-03 PROCEDURE — 99214 OFFICE O/P EST MOD 30 MIN: CPT | Mod: S$GLB,,, | Performed by: INTERNAL MEDICINE

## 2022-06-03 PROCEDURE — 3078F PR MOST RECENT DIASTOLIC BLOOD PRESSURE < 80 MM HG: ICD-10-PCS | Mod: CPTII,S$GLB,, | Performed by: INTERNAL MEDICINE

## 2022-06-03 PROCEDURE — 1101F PT FALLS ASSESS-DOCD LE1/YR: CPT | Mod: CPTII,S$GLB,, | Performed by: INTERNAL MEDICINE

## 2022-06-03 PROCEDURE — 3008F PR BODY MASS INDEX (BMI) DOCUMENTED: ICD-10-PCS | Mod: CPTII,S$GLB,, | Performed by: INTERNAL MEDICINE

## 2022-06-03 PROCEDURE — 3078F DIAST BP <80 MM HG: CPT | Mod: CPTII,S$GLB,, | Performed by: INTERNAL MEDICINE

## 2022-06-03 PROCEDURE — 1101F PR PT FALLS ASSESS DOC 0-1 FALLS W/OUT INJ PAST YR: ICD-10-PCS | Mod: CPTII,S$GLB,, | Performed by: INTERNAL MEDICINE

## 2022-06-03 NOTE — ASSESSMENT & PLAN NOTE
Seen on DXA 10/2020  Reassuring she is not fracturing     Risks include low weight,  race, menopause, ETOH, FH fractures.    Continue calcium and vitamin D intake. Ideally 8563-7294 mg/day calcium in divided doses and 1,000 units/day vitamin D. Food sources preferred but vitamins okay if needed    Fall precautions emphasized  Encourage weight bearing exercises    Tolerating fosamax well  Started 1/2021  Continue  Consider drug holiday after 5 years    Repeat DXA later this year to ensure it is working

## 2022-06-03 NOTE — PROGRESS NOTES
Subjective:      Chief Complaint: Hypothyroidism and Osteoporosis    HPI: Rosemary Parkinson is a 67 y.o. female who is here for a follow-up evaluation for osteoporosis and thyroid. Last seen 1/6/2021    Reported saw endocrine 10-15 years ago at [facility].  Was on fosamax, told to stop due to planning fertility treatment.  Also told thyroid was borderline so started medication.    For the hypothyroidism:   started years ago for optimizing fertility.  Lab Results   Component Value Date    TSH 2.539 01/04/2021    TSH 2.539 01/04/2021    FREET4 1.09 10/17/2019     Current dose: 75 mcg/day    With regards to osteoporosis:   Pt diagnosed with osteoporosis on bone density scan from: many years ago.    Fractures: denies  First bone density in the system here was: 12/2012 spine -0.3, left hip -2.0, right hip -1.8    Last bone density: 10/2020. FRAX 21.2% major, 3.1% hip   Hip: t-score -2.5 total hip, BMD 0.694     Left fem neck -2.4, BMD 0.7     Right fem neck 0.693   Spine: t-score -1.4, BMD 1.021    2020 BMD done at [facility], 2018 BMD done at [facility], different machines can't directly compare. Report mentions comparisons, but those are going back from 2012 BMD    Osteoporosis medications used in the past: fosamax for a couple of years in the past, nothing lately    Last labs:    Lab Results   Component Value Date    CALCIUM 8.8 01/04/2021    ALBUMIN 4.1 01/04/2021    CREATININE 0.7 01/04/2021    [id number] 41 08/28/2018       Osteoporosis Risk Factor Assessment:  Menopause in early 50s.  Denies history of falls over the age of fifty. Denies fractures to her wrist, hip or spine. Denies loss of height of more than 1 1/2 - 2 inches    +FH hip fracture (mother). In her 90s.    +use of hormones for fertility around 50-51 years old  Denies chronic exposure to steroid therapy, anticoagulants, proton pump inhibitors or anti-seizure medications.     Denies tobacco use, including use in the past  +EtOH, maybe 1 glass per  day.  +hypothyroidism since fertility issues.  No kidney stones, kidney disease.  No current diarrhea or h/o malabsorption    Current medication: Fosamax (started 1/2021).    Calcium intake? Gets calcium in the diet  Vit D intake? Has multivitamin  Weight bearing exercise? Some walking  Recent falls? No     Last labs:    Lab Results   Component Value Date    CALCIUM 8.8 01/04/2021    ALBUMIN 4.1 01/04/2021    CREATININE 0.7 01/04/2021    [id number] 41 08/28/2018       Today, pt reports feeling okay overall.     No weight changes  No diarrhea/constipation  No heat/cold intolerance    No falls/fractures.    Has questions about situations as above.    Reviewed past medical, family, social history and updated as appropriate.    Review of Systems   As above    Objective:     Vitals:    06/03/22 1103   BP: (!) 116/56   Pulse: (!) 58     BP Readings from Last 5 Encounters:   06/01/22 (!) 108/53   01/06/21 (!) 116/56   11/09/20 123/64   10/22/19 108/64   08/28/18 110/64     Physical Exam  Vitals reviewed.   Constitutional:       General: She is not in acute distress.  Neck:      Thyroid: No thyromegaly.   Cardiovascular:      Heart sounds: Normal heart sounds.   Pulmonary:      Effort: Pulmonary effort is normal.       Wt Readings from Last 5 Encounters:   06/01/22 1153 60.8 kg (134 lb)   05/20/22 1036 62.2 kg (137 lb 2 oz)   01/06/21 0938 62.2 kg (137 lb 2 oz)   11/09/20 1142 60.4 kg (133 lb 2.5 oz)   11/04/20 0955 59 kg (130 lb 1.1 oz)     Lab Results   Component Value Date    HGBA1C 5.1 06/08/2017    HGBA1C 5.3 12/04/2012     Lab Results   Component Value Date    CHOL 221 (H) 01/04/2021    CHOL 217 (H) 07/02/2018    HDL 68 01/04/2021    HDL 68 07/02/2018    LDLCALC 140.6 01/04/2021    LDLCALC 140.4 07/02/2018    TRIG 62 01/04/2021    TRIG 43 07/02/2018    CHOLHDL 30.8 01/04/2021    CHOLHDL 31.3 07/02/2018     Lab Results   Component Value Date     01/04/2021    K 4.5 01/04/2021     01/04/2021    CO2 26  01/04/2021    GLU 81 01/04/2021    BUN 16 01/04/2021    CREATININE 0.7 01/04/2021    CALCIUM 8.8 01/04/2021    PROT 6.9 01/04/2021    ALBUMIN 4.1 01/04/2021    BILITOT 0.6 01/04/2021    ALKPHOS 59 01/04/2021    AST 21 01/04/2021    ALT 15 01/04/2021    ANIONGAP 11 01/04/2021    ESTGFRAFRICA >60 01/04/2021    EGFRNONAA >60 01/04/2021    TSH 2.539 01/04/2021    TSH 2.539 01/04/2021      Assessment/Plan:     Osteoporosis  Seen on DXA 10/2020  Reassuring she is not fracturing     Risks include low weight,  race, menopause, ETOH, FH fractures.    Continue calcium and vitamin D intake. Ideally 2057-8095 mg/day calcium in divided doses and 1,000 units/day vitamin D. Food sources preferred but vitamins okay if needed    Fall precautions emphasized  Encourage weight bearing exercises    Tolerating fosamax well  Started 1/2021  Continue  Consider drug holiday after 5 years    Repeat DXA later this year to ensure it is working      Hypothyroid  Pt reported hx borderline thyroid levels, started on medication for fertility treatment   - clinically denies most symptoms   - no recent labs. Recheck today   - for now, continue 75 mcg/day levothyroxine   - monitor once a year, or sooner if symptoms change          Follow up in about 1 year (around 6/3/2023) for lab review, further monitoring, imaging review.      Jensen Kenyon MD  Endocrinology

## 2022-06-03 NOTE — ASSESSMENT & PLAN NOTE
Pt reported hx borderline thyroid levels, started on medication for fertility treatment   - clinically denies most symptoms   - no recent labs. Recheck today   - for now, continue 75 mcg/day levothyroxine   - monitor once a year, or sooner if symptoms change

## 2022-06-08 LAB
FINAL PATHOLOGIC DIAGNOSIS: NORMAL
Lab: NORMAL

## 2022-06-08 NOTE — PROGRESS NOTES
"Rosemary your colonoscopy pathology was benign but your colon polyp was an adenoma recommend your next surveillance colonoscopy in 3-years.    [facility] DIAGNOSIS:   COLON, ASCENDING, BIOPSY:   Tubular adenoma, without high grade dysplasia.   Linda Snowden M.D.   Report attached.   Performing site:   [address] [address]   "Disclaimer:  This case diagnosis was rendered completely by the outside   consultation pathologist and the case is electronically signed by an [facility]   pathologist listed below solely to release the report into the medical   record."    Comment: Interp By Kaylan Dumont D.O    "

## 2022-06-17 ENCOUNTER — TELEPHONE (OUTPATIENT)
Dept: INTERNAL MEDICINE | Facility: CLINIC | Age: 67
End: 2022-06-17
Payer: COMMERCIAL

## 2022-06-17 NOTE — TELEPHONE ENCOUNTER
"  Informed pt that the GI states "Rosemary your colonoscopy pathology was benign but your colon polyp was an adenoma recommend your next surveillance colonoscopy in 3-years." Pt verbalized understanding.  Pt has no further questions or concerns.    "

## 2022-06-17 NOTE — TELEPHONE ENCOUNTER
"----- Message from Michael Carrion MD sent at 6/9/2022  8:18 AM CDT -----  Regarding: results    Please be sure pt knows what GI doc recommends (below)    Thanks    ----- Message -----  From: Mati Banks MD  Sent: 6/8/2022   6:41 PM CDT  To: Mati Banks MD, MD Sanjuanita Guzmanfer your colonoscopy pathology was benign but your colon polyp was an adenoma recommend your next surveillance colonoscopy in 3-years.    [facility] DIAGNOSIS:   COLON, ASCENDING, BIOPSY:   Tubular adenoma, without high grade dysplasia.   Linda Snowden M.D.   Report attached.   Performing site:   [place] [address]   "Disclaimer:  This case diagnosis was rendered completely by the outside   consultation pathologist and the case is electronically signed by an [facility]   pathologist listed below solely to release the report into the medical   record."    Comment: Interp By Kaylan Dumont D.O        "

## 2022-07-25 DIAGNOSIS — E03.9 HYPOTHYROIDISM, UNSPECIFIED TYPE: ICD-10-CM

## 2022-07-25 RX ORDER — LEVOTHYROXINE SODIUM 75 UG/1
75 TABLET ORAL DAILY
Qty: 90 TABLET | Refills: 3 | Status: SHIPPED | OUTPATIENT
Start: 2022-07-25 | End: 2023-06-20 | Stop reason: SDUPTHER

## 2022-08-05 ENCOUNTER — LAB VISIT (OUTPATIENT)
Dept: LAB | Facility: OTHER | Age: 67
End: 2022-08-05
Attending: STUDENT IN AN ORGANIZED HEALTH CARE EDUCATION/TRAINING PROGRAM
Payer: COMMERCIAL

## 2022-08-05 DIAGNOSIS — Z00.00 PREVENTATIVE HEALTH CARE: ICD-10-CM

## 2022-08-05 LAB
ALBUMIN SERPL BCP-MCNC: 4.1 G/DL (ref 3.5–5.2)
ALP SERPL-CCNC: 52 U/L (ref 55–135)
ALT SERPL W/O P-5'-P-CCNC: 24 U/L (ref 10–44)
ANION GAP SERPL CALC-SCNC: 10 MMOL/L (ref 8–16)
AST SERPL-CCNC: 23 U/L (ref 10–40)
BASOPHILS # BLD AUTO: 0.03 K/UL (ref 0–0.2)
BASOPHILS NFR BLD: 0.8 % (ref 0–1.9)
BILIRUB SERPL-MCNC: 0.6 MG/DL (ref 0.1–1)
BUN SERPL-MCNC: 13 MG/DL (ref 8–23)
CALCIUM SERPL-MCNC: 9.1 MG/DL (ref 8.7–10.5)
CHLORIDE SERPL-SCNC: 107 MMOL/L (ref 95–110)
CHOLEST SERPL-MCNC: 223 MG/DL (ref 120–199)
CHOLEST/HDLC SERPL: 3.5 {RATIO} (ref 2–5)
CO2 SERPL-SCNC: 26 MMOL/L (ref 23–29)
CREAT SERPL-MCNC: 0.7 MG/DL (ref 0.5–1.4)
DIFFERENTIAL METHOD: ABNORMAL
EOSINOPHIL # BLD AUTO: 0.1 K/UL (ref 0–0.5)
EOSINOPHIL NFR BLD: 2.1 % (ref 0–8)
ERYTHROCYTE [DISTWIDTH] IN BLOOD BY AUTOMATED COUNT: 13.5 % (ref 11.5–14.5)
EST. GFR  (NO RACE VARIABLE): >60 ML/MIN/1.73 M^2
ESTIMATED AVG GLUCOSE: 100 MG/DL (ref 68–131)
GLUCOSE SERPL-MCNC: 92 MG/DL (ref 70–110)
HBA1C MFR BLD: 5.1 % (ref 4–5.6)
HCT VFR BLD AUTO: 41.2 % (ref 37–48.5)
HDLC SERPL-MCNC: 64 MG/DL (ref 40–75)
HDLC SERPL: 28.7 % (ref 20–50)
HGB BLD-MCNC: 13.4 G/DL (ref 12–16)
IMM GRANULOCYTES # BLD AUTO: 0.01 K/UL (ref 0–0.04)
IMM GRANULOCYTES NFR BLD AUTO: 0.3 % (ref 0–0.5)
LDLC SERPL CALC-MCNC: 148 MG/DL (ref 63–159)
LYMPHOCYTES # BLD AUTO: 1.7 K/UL (ref 1–4.8)
LYMPHOCYTES NFR BLD: 43.3 % (ref 18–48)
MCH RBC QN AUTO: 29.8 PG (ref 27–31)
MCHC RBC AUTO-ENTMCNC: 32.5 G/DL (ref 32–36)
MCV RBC AUTO: 92 FL (ref 82–98)
MONOCYTES # BLD AUTO: 0.3 K/UL (ref 0.3–1)
MONOCYTES NFR BLD: 8.1 % (ref 4–15)
NEUTROPHILS # BLD AUTO: 1.7 K/UL (ref 1.8–7.7)
NEUTROPHILS NFR BLD: 45.4 % (ref 38–73)
NONHDLC SERPL-MCNC: 159 MG/DL
NRBC BLD-RTO: 0 /100 WBC
PLATELET # BLD AUTO: 204 K/UL (ref 150–450)
PMV BLD AUTO: 10.3 FL (ref 9.2–12.9)
POTASSIUM SERPL-SCNC: 4.2 MMOL/L (ref 3.5–5.1)
PROT SERPL-MCNC: 6.6 G/DL (ref 6–8.4)
RBC # BLD AUTO: 4.5 M/UL (ref 4–5.4)
SODIUM SERPL-SCNC: 143 MMOL/L (ref 136–145)
TRIGL SERPL-MCNC: 55 MG/DL (ref 30–150)
TSH SERPL DL<=0.005 MIU/L-ACNC: 1.84 UIU/ML (ref 0.4–4)
WBC # BLD AUTO: 3.81 K/UL (ref 3.9–12.7)

## 2022-08-05 PROCEDURE — 36415 COLL VENOUS BLD VENIPUNCTURE: CPT | Performed by: STUDENT IN AN ORGANIZED HEALTH CARE EDUCATION/TRAINING PROGRAM

## 2022-08-05 PROCEDURE — 80061 LIPID PANEL: CPT | Performed by: STUDENT IN AN ORGANIZED HEALTH CARE EDUCATION/TRAINING PROGRAM

## 2022-08-05 PROCEDURE — 85025 COMPLETE CBC W/AUTO DIFF WBC: CPT | Performed by: STUDENT IN AN ORGANIZED HEALTH CARE EDUCATION/TRAINING PROGRAM

## 2022-08-05 PROCEDURE — 84443 ASSAY THYROID STIM HORMONE: CPT | Performed by: STUDENT IN AN ORGANIZED HEALTH CARE EDUCATION/TRAINING PROGRAM

## 2022-08-05 PROCEDURE — 83036 HEMOGLOBIN GLYCOSYLATED A1C: CPT | Performed by: STUDENT IN AN ORGANIZED HEALTH CARE EDUCATION/TRAINING PROGRAM

## 2022-08-05 PROCEDURE — 80053 COMPREHEN METABOLIC PANEL: CPT | Performed by: STUDENT IN AN ORGANIZED HEALTH CARE EDUCATION/TRAINING PROGRAM

## 2022-08-10 ENCOUNTER — OFFICE VISIT (OUTPATIENT)
Dept: INTERNAL MEDICINE | Facility: CLINIC | Age: 67
End: 2022-08-10
Payer: COMMERCIAL

## 2022-08-10 VITALS
SYSTOLIC BLOOD PRESSURE: 120 MMHG | HEART RATE: 60 BPM | HEIGHT: 68 IN | BODY MASS INDEX: 21.45 KG/M2 | WEIGHT: 141.56 LBS | DIASTOLIC BLOOD PRESSURE: 68 MMHG | OXYGEN SATURATION: 97 %

## 2022-08-10 DIAGNOSIS — M81.0 AGE-RELATED OSTEOPOROSIS WITHOUT CURRENT PATHOLOGICAL FRACTURE: ICD-10-CM

## 2022-08-10 DIAGNOSIS — E78.00 PURE HYPERCHOLESTEROLEMIA: ICD-10-CM

## 2022-08-10 DIAGNOSIS — Z86.010 HX OF ADENOMATOUS COLONIC POLYPS: Chronic | ICD-10-CM

## 2022-08-10 DIAGNOSIS — Z00.00 ANNUAL PHYSICAL EXAM: Primary | ICD-10-CM

## 2022-08-10 DIAGNOSIS — Z80.0 FAMILY HISTORY OF COLON CANCER: ICD-10-CM

## 2022-08-10 DIAGNOSIS — Z12.31 ENCOUNTER FOR SCREENING MAMMOGRAM FOR MALIGNANT NEOPLASM OF BREAST: ICD-10-CM

## 2022-08-10 DIAGNOSIS — E03.9 HYPOTHYROIDISM, UNSPECIFIED TYPE: ICD-10-CM

## 2022-08-10 PROCEDURE — 1101F PR PT FALLS ASSESS DOC 0-1 FALLS W/OUT INJ PAST YR: ICD-10-PCS | Mod: CPTII,S$GLB,, | Performed by: STUDENT IN AN ORGANIZED HEALTH CARE EDUCATION/TRAINING PROGRAM

## 2022-08-10 PROCEDURE — 3288F PR FALLS RISK ASSESSMENT DOCUMENTED: ICD-10-PCS | Mod: CPTII,S$GLB,, | Performed by: STUDENT IN AN ORGANIZED HEALTH CARE EDUCATION/TRAINING PROGRAM

## 2022-08-10 PROCEDURE — 99397 PR PREVENTIVE VISIT,EST,65 & OVER: ICD-10-PCS | Mod: S$GLB,,, | Performed by: STUDENT IN AN ORGANIZED HEALTH CARE EDUCATION/TRAINING PROGRAM

## 2022-08-10 PROCEDURE — 1101F PT FALLS ASSESS-DOCD LE1/YR: CPT | Mod: CPTII,S$GLB,, | Performed by: STUDENT IN AN ORGANIZED HEALTH CARE EDUCATION/TRAINING PROGRAM

## 2022-08-10 PROCEDURE — 1159F PR MEDICATION LIST DOCUMENTED IN MEDICAL RECORD: ICD-10-PCS | Mod: CPTII,S$GLB,, | Performed by: STUDENT IN AN ORGANIZED HEALTH CARE EDUCATION/TRAINING PROGRAM

## 2022-08-10 PROCEDURE — 3044F HG A1C LEVEL LT 7.0%: CPT | Mod: CPTII,S$GLB,, | Performed by: STUDENT IN AN ORGANIZED HEALTH CARE EDUCATION/TRAINING PROGRAM

## 2022-08-10 PROCEDURE — 99999 PR PBB SHADOW E&M-EST. PATIENT-LVL III: ICD-10-PCS | Mod: PBBFAC,,, | Performed by: STUDENT IN AN ORGANIZED HEALTH CARE EDUCATION/TRAINING PROGRAM

## 2022-08-10 PROCEDURE — 1159F MED LIST DOCD IN RCRD: CPT | Mod: CPTII,S$GLB,, | Performed by: STUDENT IN AN ORGANIZED HEALTH CARE EDUCATION/TRAINING PROGRAM

## 2022-08-10 PROCEDURE — 3078F DIAST BP <80 MM HG: CPT | Mod: CPTII,S$GLB,, | Performed by: STUDENT IN AN ORGANIZED HEALTH CARE EDUCATION/TRAINING PROGRAM

## 2022-08-10 PROCEDURE — 3074F SYST BP LT 130 MM HG: CPT | Mod: CPTII,S$GLB,, | Performed by: STUDENT IN AN ORGANIZED HEALTH CARE EDUCATION/TRAINING PROGRAM

## 2022-08-10 PROCEDURE — 3008F PR BODY MASS INDEX (BMI) DOCUMENTED: ICD-10-PCS | Mod: CPTII,S$GLB,, | Performed by: STUDENT IN AN ORGANIZED HEALTH CARE EDUCATION/TRAINING PROGRAM

## 2022-08-10 PROCEDURE — 99397 PER PM REEVAL EST PAT 65+ YR: CPT | Mod: S$GLB,,, | Performed by: STUDENT IN AN ORGANIZED HEALTH CARE EDUCATION/TRAINING PROGRAM

## 2022-08-10 PROCEDURE — 3008F BODY MASS INDEX DOCD: CPT | Mod: CPTII,S$GLB,, | Performed by: STUDENT IN AN ORGANIZED HEALTH CARE EDUCATION/TRAINING PROGRAM

## 2022-08-10 PROCEDURE — 1126F AMNT PAIN NOTED NONE PRSNT: CPT | Mod: CPTII,S$GLB,, | Performed by: STUDENT IN AN ORGANIZED HEALTH CARE EDUCATION/TRAINING PROGRAM

## 2022-08-10 PROCEDURE — 1126F PR PAIN SEVERITY QUANTIFIED, NO PAIN PRESENT: ICD-10-PCS | Mod: CPTII,S$GLB,, | Performed by: STUDENT IN AN ORGANIZED HEALTH CARE EDUCATION/TRAINING PROGRAM

## 2022-08-10 PROCEDURE — 99999 PR PBB SHADOW E&M-EST. PATIENT-LVL III: CPT | Mod: PBBFAC,,, | Performed by: STUDENT IN AN ORGANIZED HEALTH CARE EDUCATION/TRAINING PROGRAM

## 2022-08-10 PROCEDURE — 3288F FALL RISK ASSESSMENT DOCD: CPT | Mod: CPTII,S$GLB,, | Performed by: STUDENT IN AN ORGANIZED HEALTH CARE EDUCATION/TRAINING PROGRAM

## 2022-08-10 PROCEDURE — 3078F PR MOST RECENT DIASTOLIC BLOOD PRESSURE < 80 MM HG: ICD-10-PCS | Mod: CPTII,S$GLB,, | Performed by: STUDENT IN AN ORGANIZED HEALTH CARE EDUCATION/TRAINING PROGRAM

## 2022-08-10 PROCEDURE — 3044F PR MOST RECENT HEMOGLOBIN A1C LEVEL <7.0%: ICD-10-PCS | Mod: CPTII,S$GLB,, | Performed by: STUDENT IN AN ORGANIZED HEALTH CARE EDUCATION/TRAINING PROGRAM

## 2022-08-10 PROCEDURE — 3074F PR MOST RECENT SYSTOLIC BLOOD PRESSURE < 130 MM HG: ICD-10-PCS | Mod: CPTII,S$GLB,, | Performed by: STUDENT IN AN ORGANIZED HEALTH CARE EDUCATION/TRAINING PROGRAM

## 2022-08-10 NOTE — PROGRESS NOTES
[facility]    Subjective:       Patient ID: Rosemary Parkinson is a 67 y.o. female.    Chief Complaint: Annual Exam      History was obtained from the patient and supplemented through chart review.  This patient is known to me.     HPI:    Patient is a 67 y.o. female w/ hypothyroidism and osteoporosis. Presents for annual.    Hypothyroid  Started due to fertility efforts in the past  Stable on 75 mcg synthroid  Follows with endocrinology    Osteoporosis  Fosamax started 1/2021  Consider drug holiday after 5 years  dexa late this year, pt wishes to schedule herself    Discussed in the past, not today  4 kids- stays busy  Diet: tried to maintain vegetables, fiber, omega 3, busy with kids  Keeps calories between 10-12 and 5-6 in day      Medical History  Past Medical History:   Diagnosis Date    Hx of adenomatous colonic polyps     Hypothyroid     Lactose intolerance in adult     Osteoporosis        Review of Systems   Constitutional: Negative for appetite change, diaphoresis and fever.   HENT: Negative for rhinorrhea and trouble swallowing.    Respiratory: Negative for shortness of breath.    Cardiovascular: Negative for chest pain.   Gastrointestinal: Negative for diarrhea, nausea and vomiting.   Genitourinary: Negative for hematuria.   Musculoskeletal: Negative for gait problem and myalgias.   Neurological: Negative for dizziness, seizures and weakness.   Psychiatric/Behavioral: Negative for hallucinations. The patient is not nervous/anxious.          Surgical hx, family hx, social hx   Family hx of colon cancer    Have been reviewed      Current Outpatient Medications:     alendronate (FOSAMAX) 70 MG tablet, Take 1 tablet (70 mg total) by mouth every 7 days. Take with a full glass of water & remain upright for at least 30 minutes. Last appointment 1/6/2021. Follow-up needed before additional refills can be approved., Disp: 12 tablet, Rfl: 0    levothyroxine (SYNTHROID) 75 MCG tablet, Take 1  "tablet (75 mcg total) by mouth once daily., Disp: 90 tablet, Rfl: 3    MULTIVIT-IRON-MIN-FOLIC ACID 3,500-18-0.4 UNIT-MG-MG ORAL CHEW, Take 1 tablet by mouth once daily at 6am., Disp: , Rfl:     flu vac [id number] 36mos up,PF, 60 mcg (15 mcg x 4)/0.5 mL Syrg, Inject into the muscle (Patient not taking: Reported on 10/22/2019), Disp: 0.5 mL, Rfl: 0    pneumoc 20-frieda conj-dip cr,PF, (PREVNAR-20, PF,) 0.5 mL Syrg injection, Inject 0.5 mLs into the muscle., Disp: 0.5 mL, Rfl: 0    sodium,potassium,mag sulfates (SUPREP BOWEL PREP KIT) 17.5-3.13-1.6 gram SolR, As Directed, Disp: 1 kit, Rfl: 0    TAZORAC 0.1 % cream, , Disp: , Rfl:     varicella-zoster gE-AS01B, PF, (SHINGRIX, PF,) 50 mcg/0.5 mL injection, Inject 0.5 mLs into the muscle once. for 1 dose, Disp: 1 each, Rfl: 0    Objective:        Body mass index is 21.52 kg/m².  Vitals:    08/10/22 1538   BP: 120/68   Pulse: 60   SpO2: 97%   Weight: 64.2 kg (141 lb 8.6 oz)   Height: 5' 8" (1.727 m)   PainSc: 0-No pain     Physical Exam  Vitals and nursing note reviewed.   Constitutional:       General: She is not in acute distress.     Appearance: Normal appearance. She is well-developed. She is not diaphoretic.   HENT:      Head: Normocephalic and atraumatic.   Eyes:      General: No scleral icterus.  Neck:      Vascular: No JVD.   Cardiovascular:      Rate and Rhythm: Normal rate and regular rhythm.      Heart sounds: Normal heart sounds. No murmur heard.  Pulmonary:      Effort: Pulmonary effort is normal. No respiratory distress.      Breath sounds: Normal breath sounds. No wheezing or rales.   Abdominal:      General: There is no distension.      Palpations: Abdomen is soft. There is no mass.      Tenderness: There is no abdominal tenderness.   Musculoskeletal:         General: Normal range of motion.      Cervical back: Normal range of motion and neck supple.   Skin:     General: Skin is warm and dry.   Neurological:      Mental Status: She is alert and oriented " to person, place, and time.      Cranial Nerves: No cranial nerve deficit.   Psychiatric:         Behavior: Behavior normal.           Lab Results   Component Value Date    WBC 3.81 (L) 08/05/2022    HGB 13.4 08/05/2022    HCT 41.2 08/05/2022     08/05/2022    CHOL 223 (H) 08/05/2022    TRIG 55 08/05/2022    HDL 64 08/05/2022    ALT 24 08/05/2022    AST 23 08/05/2022     08/05/2022    K 4.2 08/05/2022     08/05/2022    CREATININE 0.7 08/05/2022    BUN 13 08/05/2022    CO2 26 08/05/2022    TSH 1.840 08/05/2022    HGBA1C 5.1 08/05/2022       The 10-year ASCVD risk score (Irena MARTINEZ Jr., et al., 2013) is: 6.1%    Values used to calculate the score:      Age: 67 years      Sex: Female      Is Non- : No      Diabetic: No      Tobacco smoker: No      Systolic Blood Pressure: 120 mmHg      Is BP treated: No      HDL Cholesterol: 64 mg/dL      Total Cholesterol: 223 mg/dL    (Imaging have been independently reviewed)  Mammo and DEXA normal    Assessment:         1. Annual physical exam    2. Pure hypercholesterolemia    3. Family history of colon cancer    4. Hypothyroidism, unspecified type    5. Hx of adenomatous colonic polyps    6. Age-related osteoporosis without current pathological fracture    7. Encounter for screening mammogram for malignant neoplasm of breast          Plan:     Rosemary was seen today for annual exam.    Diagnoses and all orders for this visit:    Annual physical exam    Pure hypercholesterolemia    Family history of colon cancer    Hypothyroidism, unspecified type    Hx of adenomatous colonic polyps    Age-related osteoporosis without current pathological fracture    Encounter for screening mammogram for malignant neoplasm of breast  -     Mammo Digital Screening Bilat w/ Hema; Future        Health Maintenance  - Lipids: normal/borderline 8/5/2022  - A1C: 5.1 8/5/2022  - Colon Ca Screen: adenomatous polyp found 6/2022, repeat in 2025 w/ Dr. Banks with  [facility]   - Immunizations: shingles, PNA due; needs covid vaccine    Women's health  - Pap: s/p hyst for AUB  - Mammo: BIRADS 1 5/20/2022  - Dexa: osteoporosis Oct 2020  - Contraception: s/p hyst     Follow up in about 1 year (around 8/10/2023). or sooner prn        All medications were reviewed including potential side effects and risks/benefits.  Pt was counseled to call back if anything worsens or if questions arise.    Michael Carrion MD  Family Medicine  [facility]  [id number]  [place] [address]  Phone [phone]  Fax [phone]

## 2022-10-18 DIAGNOSIS — M81.0 AGE-RELATED OSTEOPOROSIS WITHOUT CURRENT PATHOLOGICAL FRACTURE: ICD-10-CM

## 2022-10-18 RX ORDER — ALENDRONATE SODIUM 70 MG/1
70 TABLET ORAL
Qty: 12 TABLET | Refills: 2 | Status: SHIPPED | OUTPATIENT
Start: 2022-10-18

## 2022-11-12 ENCOUNTER — PATIENT MESSAGE (OUTPATIENT)
Dept: ENDOCRINOLOGY | Facility: CLINIC | Age: 67
End: 2022-11-12
Payer: COMMERCIAL

## 2023-06-20 ENCOUNTER — PATIENT MESSAGE (OUTPATIENT)
Dept: INTERNAL MEDICINE | Facility: CLINIC | Age: 68
End: 2023-06-20
Payer: COMMERCIAL

## 2023-06-20 DIAGNOSIS — E03.9 HYPOTHYROIDISM, UNSPECIFIED TYPE: ICD-10-CM

## 2023-06-20 RX ORDER — LEVOTHYROXINE SODIUM 75 UG/1
75 TABLET ORAL DAILY
Qty: 90 TABLET | Refills: 0 | Status: SHIPPED | OUTPATIENT
Start: 2023-06-20 | End: 2023-09-25 | Stop reason: SDUPTHER

## 2023-06-20 NOTE — TELEPHONE ENCOUNTER
No care due was identified.  [facility] Embedded Care Due Messages. Reference number: [id number].   6/20/2023 10:49:17 AM CDT

## 2023-09-25 DIAGNOSIS — E03.9 HYPOTHYROIDISM, UNSPECIFIED TYPE: ICD-10-CM

## 2023-09-25 NOTE — TELEPHONE ENCOUNTER
Care Due:                  Date            Visit Type   Department     Provider  --------------------------------------------------------------------------------                                EP -                              PRIMARY      [facility] INTERNAL  Last Visit: 08-      CARE ([facility])   MEDICINE       Michael Carrion  Next Visit: None Scheduled  None         None Found                                                            Last  Test          Frequency    Reason                     Performed    Due Date  --------------------------------------------------------------------------------    Office Visit  12 months..  levothyroxine............  08-   08-    TSH.........  12 months..  levothyroxine............  08- 07-    [employer] Embedded Care Due Messages. Reference number: [id number].   9/25/2023 10:50:13 AM CDT

## 2023-09-26 RX ORDER — LEVOTHYROXINE SODIUM 75 UG/1
75 TABLET ORAL DAILY
Qty: 90 TABLET | Refills: 0 | Status: SHIPPED | OUTPATIENT
Start: 2023-09-26 | End: 2023-12-18 | Stop reason: SDUPTHER

## 2023-09-26 NOTE — TELEPHONE ENCOUNTER
Refill Routing Note   Medication(s) are not appropriate for processing by [facility] for the following reason(s):      Required labs outdated    [facility] action(s):  Defer Care Due:  Appointment due  Labs due          Appointments  past 12m or future 3m with PCP    Date Provider   Last Visit   8/10/2022 Michael Carrion MD   Next Visit   Visit date not found Michael Carrion MD   ED visits in past 90 days: 0        Note composed:10:19 PM 09/25/2023

## 2023-09-27 NOTE — TELEPHONE ENCOUNTER
LVM for patient to call the office back to schedule an appointment, also sent a Care Thread message.

## 2023-10-04 ENCOUNTER — OFFICE VISIT (OUTPATIENT)
Dept: INTERNAL MEDICINE | Facility: CLINIC | Age: 68
End: 2023-10-04
Payer: COMMERCIAL

## 2023-10-04 ENCOUNTER — TELEPHONE (OUTPATIENT)
Dept: ORTHOPEDICS | Facility: CLINIC | Age: 68
End: 2023-10-04
Payer: COMMERCIAL

## 2023-10-04 VITALS
DIASTOLIC BLOOD PRESSURE: 64 MMHG | WEIGHT: 142.63 LBS | BODY MASS INDEX: 21.62 KG/M2 | HEART RATE: 53 BPM | SYSTOLIC BLOOD PRESSURE: 118 MMHG | HEIGHT: 68 IN | OXYGEN SATURATION: 98 %

## 2023-10-04 DIAGNOSIS — E03.9 HYPOTHYROIDISM, UNSPECIFIED TYPE: ICD-10-CM

## 2023-10-04 DIAGNOSIS — Z00.00 PREVENTATIVE HEALTH CARE: Primary | ICD-10-CM

## 2023-10-04 DIAGNOSIS — M81.0 AGE-RELATED OSTEOPOROSIS WITHOUT CURRENT PATHOLOGICAL FRACTURE: ICD-10-CM

## 2023-10-04 PROCEDURE — 3288F FALL RISK ASSESSMENT DOCD: CPT | Mod: CPTII,S$GLB,, | Performed by: PHYSICIAN ASSISTANT

## 2023-10-04 PROCEDURE — 1126F PR PAIN SEVERITY QUANTIFIED, NO PAIN PRESENT: ICD-10-PCS | Mod: CPTII,S$GLB,, | Performed by: PHYSICIAN ASSISTANT

## 2023-10-04 PROCEDURE — 99397 PER PM REEVAL EST PAT 65+ YR: CPT | Mod: S$GLB,,, | Performed by: PHYSICIAN ASSISTANT

## 2023-10-04 PROCEDURE — 99397 PR PREVENTIVE VISIT,EST,65 & OVER: ICD-10-PCS | Mod: S$GLB,,, | Performed by: PHYSICIAN ASSISTANT

## 2023-10-04 PROCEDURE — 1159F PR MEDICATION LIST DOCUMENTED IN MEDICAL RECORD: ICD-10-PCS | Mod: CPTII,S$GLB,, | Performed by: PHYSICIAN ASSISTANT

## 2023-10-04 PROCEDURE — 3008F BODY MASS INDEX DOCD: CPT | Mod: CPTII,S$GLB,, | Performed by: PHYSICIAN ASSISTANT

## 2023-10-04 PROCEDURE — 1126F AMNT PAIN NOTED NONE PRSNT: CPT | Mod: CPTII,S$GLB,, | Performed by: PHYSICIAN ASSISTANT

## 2023-10-04 PROCEDURE — 1159F MED LIST DOCD IN RCRD: CPT | Mod: CPTII,S$GLB,, | Performed by: PHYSICIAN ASSISTANT

## 2023-10-04 PROCEDURE — 3078F DIAST BP <80 MM HG: CPT | Mod: CPTII,S$GLB,, | Performed by: PHYSICIAN ASSISTANT

## 2023-10-04 PROCEDURE — 3078F PR MOST RECENT DIASTOLIC BLOOD PRESSURE < 80 MM HG: ICD-10-PCS | Mod: CPTII,S$GLB,, | Performed by: PHYSICIAN ASSISTANT

## 2023-10-04 PROCEDURE — 3288F PR FALLS RISK ASSESSMENT DOCUMENTED: ICD-10-PCS | Mod: CPTII,S$GLB,, | Performed by: PHYSICIAN ASSISTANT

## 2023-10-04 PROCEDURE — 1101F PT FALLS ASSESS-DOCD LE1/YR: CPT | Mod: CPTII,S$GLB,, | Performed by: PHYSICIAN ASSISTANT

## 2023-10-04 PROCEDURE — 1101F PR PT FALLS ASSESS DOC 0-1 FALLS W/OUT INJ PAST YR: ICD-10-PCS | Mod: CPTII,S$GLB,, | Performed by: PHYSICIAN ASSISTANT

## 2023-10-04 PROCEDURE — 3074F PR MOST RECENT SYSTOLIC BLOOD PRESSURE < 130 MM HG: ICD-10-PCS | Mod: CPTII,S$GLB,, | Performed by: PHYSICIAN ASSISTANT

## 2023-10-04 PROCEDURE — 3008F PR BODY MASS INDEX (BMI) DOCUMENTED: ICD-10-PCS | Mod: CPTII,S$GLB,, | Performed by: PHYSICIAN ASSISTANT

## 2023-10-04 PROCEDURE — 99999 PR PBB SHADOW E&M-EST. PATIENT-LVL IV: ICD-10-PCS | Mod: PBBFAC,,, | Performed by: PHYSICIAN ASSISTANT

## 2023-10-04 PROCEDURE — 3074F SYST BP LT 130 MM HG: CPT | Mod: CPTII,S$GLB,, | Performed by: PHYSICIAN ASSISTANT

## 2023-10-04 PROCEDURE — 99999 PR PBB SHADOW E&M-EST. PATIENT-LVL IV: CPT | Mod: PBBFAC,,, | Performed by: PHYSICIAN ASSISTANT

## 2023-10-04 NOTE — PROGRESS NOTES
Internal Medicine Annual Exam       CHIEF COMPLAINT     The patient, Roesmary Parkinson, who is a 68 y.o. female presents for an annual exam.    HPI     PCP is Michael Carrion MD, patient is new to me.     Patient is a 67 y.o. female w/ hypothyroidism and osteoporosis. Presents for annual.     Hypothyroid  Started due to fertility efforts in the past  Stable on 75 mcg synthroid  Follows with endocrinology -but needs to establish with a new provider as Dr. Kenyon no longer practicing at [facility].      Osteoporosis  Fosamax started 2021  Consider drug holiday after 5 years  dexa , due for repeat -- wishes to schedule herself      Discussed in the past, not today  4 kids- stays busy  Diet: tried to maintain vegetables, fiber, omega 3, busy with kids  Keeps calories between 10-12 and 5-6 in day    Past Medical History:  Past Medical History:   Diagnosis Date    Hx of adenomatous colonic polyps     Hypothyroid     Lactose intolerance in adult     Osteoporosis        Past Surgical History:   Procedure Laterality Date    APPENDECTOMY       SECTION      COLONOSCOPY N/A 2016    Procedure: COLONOSCOPY;  Surgeon: Mati Banks MD;  Location: [address]);  Service: Endoscopy;  Laterality: N/A;    COLONOSCOPY N/A 2022    Procedure: COLONOSCOPY;  Surgeon: Mati Banks MD;  Location: [address]);  Service: Endoscopy;  Laterality: N/A;  fully vaccinated    HYSTERECTOMY      TONSILLECTOMY, ADENOIDECTOMY      TOTAL ABDOMINAL HYSTERECTOMY W/ BILATERAL SALPINGOOPHORECTOMY          Family History   Problem Relation Age of Onset    Cancer Father         colon    Glaucoma Father     Cancer Maternal Grandmother 70        colon    Glaucoma Brother     Osteoporosis Neg Hx         Social History     Socioeconomic History    Marital status:     Number of children: 4   Occupational History     Employer: [employer]   Tobacco Use    Smoking status: Never    Smokeless tobacco: Never     Tobacco comments:     in college   Substance and Sexual Activity    Alcohol use: Yes     Alcohol/week: 2.0 standard drinks of alcohol     Types: 2 Glasses of wine per week     Comment: social     Drug use: No        Social History     Tobacco Use   Smoking Status Never   Smokeless Tobacco Never   Tobacco Comments    in college        Allergies as of 10/04/2023 - Reviewed 06/20/2023   Allergen Reaction Noted    Demerol [meperidine] Other (See Comments) 12/10/2012          Home Medications:  Prior to Admission medications    Medication Sig Start Date End Date Taking? Authorizing Provider   alendronate (FOSAMAX) 70 MG tablet Take 1 tablet (70 mg total) by mouth every 7 days. Take with a full glass of water & remain upright for at least 30 minutes. 10/18/22   Jensen Kenyon MD   flu vac [id number] 36mos up,PF, 60 mcg (15 mcg x 4)/0.5 mL Syrg Inject into the muscle  Patient not taking: Reported on 10/22/2019 10/19/18   Lien Nava, PharmD   levothyroxine (SYNTHROID) 75 MCG tablet Take 1 tablet (75 mcg total) by mouth once daily. 9/26/23 9/25/24  Michael Carrion MD   MULTIVIT-IRON-MIN-FOLIC ACID 3,500-18-0.4 UNIT-MG-MG ORAL CHEW Take 1 tablet by mouth once daily at 6am.    Provider, Historical   pneumoc 20-frieda conj-dip cr,PF, (PREVNAR-20, PF,) 0.5 mL Syrg injection Inject 0.5 mLs into the muscle. 8/10/22   Michael Carrion MD   sodium,potassium,mag sulfates (SUPREP BOWEL PREP KIT) 17.5-3.13-1.6 gram SolR As Directed 5/25/22   Mati Banks MD   TAZORAC 0.1 % cream  10/16/13   Provider, Historical       Review of Systems:  Review of Systems   Constitutional:  Negative for chills and fever.   HENT:  Negative for sore throat and trouble swallowing.    Eyes:  Negative for visual disturbance.   Respiratory:  Negative for cough and shortness of breath.    Cardiovascular:  Negative for chest pain.   Gastrointestinal:  Negative for abdominal pain, constipation, diarrhea, nausea and vomiting.   Genitourinary:   "Negative for dysuria and flank pain.   Musculoskeletal:  Negative for back pain, neck pain and neck stiffness.   Skin:  Negative for rash.   Neurological:  Negative for dizziness, syncope, weakness and headaches.   Psychiatric/Behavioral:  Negative for confusion.        Health Maintainence:   Immunizations:  Health Maintenance         Date Due Completion Date    COVID-19 Vaccine (4 - Mixed Product series) 12/21/2021 10/26/2021    Mammogram 05/20/2023 5/20/2022    Influenza Vaccine (1) 09/01/2023 10/7/2021    DEXA Scan 10/15/2023 10/15/2020    TETANUS VACCINE 05/25/2026 5/25/2016    Colorectal Cancer Screening 06/01/2027 6/1/2022    Override on 6/7/2010: Done    Lipid Panel 08/05/2027 8/5/2022             PHYSICAL EXAM     /64 (BP Location: Left arm, Patient Position: Sitting, BP Method: Medium (Manual))   Pulse (!) 53   Ht 5' 8" (1.727 m)   Wt 64.7 kg (142 lb 10.2 oz)   SpO2 98%   BMI 21.69 kg/m²  There is no height or weight on file to calculate BMI.    Physical Exam  Vitals and nursing note reviewed.   Constitutional:       Appearance: Normal appearance.      Comments: Healthy appearing female in NAD or apparent pain. She makes good eye contact, speaks in clear full sentences and ambulates with ease.        HENT:      Head: Normocephalic and atraumatic.      Nose: Nose normal.      Mouth/Throat:      Pharynx: Oropharynx is clear.   Eyes:      Conjunctiva/sclera: Conjunctivae normal.   Cardiovascular:      Rate and Rhythm: Normal rate and regular rhythm.      Pulses: Normal pulses.   Pulmonary:      Effort: No respiratory distress.   Abdominal:      Tenderness: There is no abdominal tenderness.   Musculoskeletal:         General: Normal range of motion.      Cervical back: No rigidity.   Skin:     General: Skin is warm and dry.      Capillary Refill: Capillary refill takes less than 2 seconds.      Findings: No rash.   Neurological:      General: No focal deficit present.      Mental Status: She is " alert.      Gait: Gait normal.   Psychiatric:         Mood and Affect: Mood normal.         LABS     Lab Results   Component Value Date    HGBA1C 5.1 08/05/2022     CMP  Sodium   Date Value Ref Range Status   08/05/2022 143 136 - 145 mmol/L Final     Potassium   Date Value Ref Range Status   08/05/2022 4.2 3.5 - 5.1 mmol/L Final     Chloride   Date Value Ref Range Status   08/05/2022 107 95 - 110 mmol/L Final     CO2   Date Value Ref Range Status   08/05/2022 26 23 - 29 mmol/L Final     Glucose   Date Value Ref Range Status   08/05/2022 92 70 - 110 mg/dL Final     BUN   Date Value Ref Range Status   08/05/2022 13 8 - 23 mg/dL Final     Creatinine   Date Value Ref Range Status   08/05/2022 0.7 0.5 - 1.4 mg/dL Final     Calcium   Date Value Ref Range Status   08/05/2022 9.1 8.7 - 10.5 mg/dL Final     Total Protein   Date Value Ref Range Status   08/05/2022 6.6 6.0 - 8.4 g/dL Final     Albumin   Date Value Ref Range Status   08/05/2022 4.1 3.5 - 5.2 g/dL Final     Total Bilirubin   Date Value Ref Range Status   08/05/2022 0.6 0.1 - 1.0 mg/dL Final     Comment:     For infants and newborns, interpretation of results should be based  on gestational age, weight and in agreement with clinical  observations.    Premature Infant recommended reference ranges:  Up to 24 hours.............<8.0 mg/dL  Up to 48 hours............<12.0 mg/dL  3-5 days..................<15.0 mg/dL  6-29 days.................<15.0 mg/dL       Alkaline Phosphatase   Date Value Ref Range Status   08/05/2022 52 (L) 55 - 135 U/L Final     AST   Date Value Ref Range Status   08/05/2022 23 10 - 40 U/L Final     ALT   Date Value Ref Range Status   08/05/2022 24 10 - 44 U/L Final     Anion Gap   Date Value Ref Range Status   08/05/2022 10 8 - 16 mmol/L Final     eGFR if    Date Value Ref Range Status   01/04/2021 >60 >60 mL/min/1.73 m^2 Final     eGFR if non    Date Value Ref Range Status   01/04/2021 >60 >60 mL/min/1.73 m^2  Final     Comment:     Calculation used to obtain the estimated glomerular filtration  rate (eGFR) is the CKD-EPI equation.        Lab Results   Component Value Date    WBC 3.81 (L) 08/05/2022    HGB 13.4 08/05/2022    HCT 41.2 08/05/2022    MCV 92 08/05/2022     08/05/2022     Lab Results   Component Value Date    CHOL 223 (H) 08/05/2022    CHOL 221 (H) 01/04/2021    CHOL 217 (H) 07/02/2018     Lab Results   Component Value Date    HDL 64 08/05/2022    HDL 68 01/04/2021    HDL 68 07/02/2018     Lab Results   Component Value Date    LDLCALC 148.0 08/05/2022    LDLCALC 140.6 01/04/2021    LDLCALC 140.4 07/02/2018     Lab Results   Component Value Date    TRIG 55 08/05/2022    TRIG 62 01/04/2021    TRIG 43 07/02/2018     Lab Results   Component Value Date    CHOLHDL 28.7 08/05/2022    CHOLHDL 30.8 01/04/2021    CHOLHDL 31.3 07/02/2018     Lab Results   Component Value Date    TSH 1.840 08/05/2022       ASSESSMENT/PLAN     Rosemary Parkinson is a 68 y.o. female    Rosemary was seen today for annual exam. She is feeling well, will schedule screening studies on her own on the .     Diagnoses and all orders for this visit:    Preventative health care    Hypothyroidism, unspecified type  -     Ambulatory referral/consult to Endocrinology; Future    Age-related osteoporosis without current pathological fracture        Rosalie Moya PA-C  Department of Internal Medicine - [facility]   8:10 AM

## 2023-12-18 DIAGNOSIS — E03.9 HYPOTHYROIDISM, UNSPECIFIED TYPE: ICD-10-CM

## 2023-12-18 RX ORDER — LEVOTHYROXINE SODIUM 75 UG/1
75 TABLET ORAL DAILY
Qty: 90 TABLET | Refills: 0 | Status: SHIPPED | OUTPATIENT
Start: 2023-12-18 | End: 2024-03-20 | Stop reason: SDUPTHER

## 2023-12-18 NOTE — TELEPHONE ENCOUNTER
No care due was identified.  Health [facility] Embedded Care Due Messages. Reference number: [id number].   12/18/2023 4:07:23 PM CST

## 2023-12-18 NOTE — TELEPHONE ENCOUNTER
No care due was identified.  Health system Embedded Care Due Messages. Reference number: [id number].   12/18/2023 4:31:53 PM CST

## 2023-12-18 NOTE — TELEPHONE ENCOUNTER
I will refill the med but please let her know that she has outstanding lab work that she needs to complete.

## 2024-01-27 RX ORDER — NIRMATRELVIR AND RITONAVIR 300-100 MG
KIT ORAL
Qty: 30 TABLET | Refills: 0 | Status: SHIPPED | OUTPATIENT
Start: 2024-01-27

## 2024-03-20 DIAGNOSIS — E03.9 HYPOTHYROIDISM, UNSPECIFIED TYPE: ICD-10-CM

## 2024-03-20 RX ORDER — LEVOTHYROXINE SODIUM 75 UG/1
75 TABLET ORAL DAILY
Qty: 15 TABLET | Refills: 0 | Status: SHIPPED | OUTPATIENT
Start: 2024-03-20 | End: 2024-05-17 | Stop reason: SDUPTHER

## 2024-03-20 NOTE — PROGRESS NOTES
Subjective:      Patient ID: Rosemary Parkinson is a 69 y.o. female.    Chief Complaint:  Osteoporosis and Hypothyroidism    History of Present Illness  Rosemary Parkinson is here for follow up of Hypothyroidism and Osteoporosis.  Previously seen by Dr Malone.  Last seen 2022.  This is their first visit with me.      With regards to Hypothyroidism:    FH of thyroid disease: Denies  FH of thyroid cancer: Denies  Personal history of FNA, MATT, thyroid surgery, or head/neck radiation treatment: - started on replacement in her 50s for fertility treatment      Lab Results   Component Value Date    TSH 1.840 08/05/2022    FREET4 1.09 10/17/2019     Current Medication:  Levothyroxine 75mcg daily     Calcium/Iron: calcium - at night     Biotin Use: Yes - in PM     Takes thyroid medication properly without food first thing in the morning.    Current Symptoms:   Reports brittle nails (chronic issue)     With regards to Osteoporosis:     BMD:   10/2020  The L1 to L4 vertebral bone mineral density is equal to 1.021 g/cm squared with a T score of -1.4.  There has been a 11.7% statistically significant change relative to the prior study.  The left femoral neck bone mineral density is equal to 0.700 g/cm squared with a T score of -2.4.  There has been  a -7.2% statistically significant change relative to the prior study.  The total hip bone mineral density is equal to 0.694 g/cm squared with a T score of -2.5.  There is a 21.2% risk of a major osteoporotic fracture and a 3.1% risk of hip fracture in the next 10 years (FRAX).  Impression:  Osteoporosis    Medications:   Fosamax  Start 1/2021    Missing doses - twice a month.     Calcium intake: combo pill  Vit D intake: combo pill - unsure of dose     Weight bearing exercise: walking   Falls: Denies  Fractures: Denies  Significant height loss (>2 inches): Denies    Family history: Denies     Menopause: early 50s    Tobacco Use: Denies  Alcohol Use: Rare   Glucocorticoid History:  "Denies  Anticoagulant Use: Denies  GERD/PPI Use: Denies  History of Malabsorption: Denies  Antiseizure Medications: Denies  History of Thyroid Disease: Denies  Kidney Disease: Denies  Personal history of kidney stones: Denies  Family history of kidney stones: father  MI: Denies  Stroke: Denies  Dental Visit: > 1 year ago     Denies point tenderness along spine  Denies bilateral hip tenderness  Gait - steady    Lab Results   Component Value Date    CALCIUM 9.1 08/05/2022     Vit D, 25-Hydroxy   Date Value Ref Range Status   08/28/2018 41 30 - 96 ng/mL Final     Comment:     Vitamin D deficiency.........<10 ng/mL                              Vitamin D insufficiency......10-29 ng/mL       Vitamin D sufficiency........> or equal to 30 ng/mL  Vitamin D toxicity............>100 ng/mL         Review of Systems  As above    Objective:   Physical Exam  Vitals reviewed.   Neck:      Thyroid: No thyromegaly.   Cardiovascular:      Rate and Rhythm: Normal rate.      Comments: No edema present  Pulmonary:      Effort: Pulmonary effort is normal.   Abdominal:      Palpations: Abdomen is soft.         Visit Vitals  /67   Pulse 62   Ht 5' 8" (1.727 m)   Wt 67.3 kg (148 lb 5.9 oz)   SpO2 97%   BMI 22.56 kg/m²       Body mass index is 22.56 kg/m².    Lab Review:   Lab Results   Component Value Date    HGBA1C 5.1 08/05/2022    HGBA1C 5.1 06/08/2017    HGBA1C 5.3 12/04/2012       Lab Results   Component Value Date    CHOL 223 (H) 08/05/2022    HDL 64 08/05/2022    LDLCALC 148.0 08/05/2022    TRIG 55 08/05/2022    CHOLHDL 28.7 08/05/2022     Lab Results   Component Value Date     08/05/2022    K 4.2 08/05/2022     08/05/2022    CO2 26 08/05/2022    GLU 92 08/05/2022    BUN 13 08/05/2022    CREATININE 0.7 08/05/2022    CALCIUM 9.1 08/05/2022    PROT 6.6 08/05/2022    ALBUMIN 4.1 08/05/2022    BILITOT 0.6 08/05/2022    ALKPHOS 52 (L) 08/05/2022    AST 23 08/05/2022    ALT 24 08/05/2022    ANIONGAP 10 08/05/2022    " ESTGFRAFRICA >60 01/04/2021    EGFRNONAA >60 01/04/2021    TSH 1.840 08/05/2022     Vit D, 25-Hydroxy   Date Value Ref Range Status   08/28/2018 41 30 - 96 ng/mL Final     Comment:     Vitamin D deficiency.........<10 ng/mL                              Vitamin D insufficiency......10-29 ng/mL       Vitamin D sufficiency........> or equal to 30 ng/mL  Vitamin D toxicity............>100 ng/mL       Assessment and Plan     1. Hypothyroidism, unspecified type  Ambulatory referral/consult to Endocrinology    Vitamin D      2. Age-related osteoporosis without current pathological fracture  DXA Bone Density Axial Skeleton 1 or more sites    Vitamin D        Hypothyroid  -- Labs now.  -- Calcium and iron need to be  from thyroid hormone replacement by 4 or > hours.  -- Please note: if you are taking biotin please hold it for 5 days prior to labs as it can interfere with the thyroid testing.  -- Medication Changes:   Levothyroxine 75mcg daily   -- Clinically and biochemically euthyroid.  -- Goal is a normal TSH.  -- Avoid exogenous hyperthyroidism as this can accelerate bone loss and increase risk of CV complications.  -- Advised to take LT4 on an empty stomach with water and to wait 30-45 minutes before eating or taking other medications   -- Reviewed usual times of thyroid hormone changes  -- Reviewed that symptoms of hypothyroidism may not correlate with tsh, and a normal TSH is the goal of therapy. Symptoms are not a justification for over treatment.    Osteoporosis  -- Risks include low weight,  race, menopause.  -- Reviewed basics of quantity versus quality.  -- Reassuring they are not fracturing.  -- Recommend:  -Pharmacological therapy is recommended for patients with osteopenia if the 10 year probability of a hip fracture is >3% and 10 year probability of other major osteoporotic fractures is >20%.  Treatment options and potential side effects discussed for PO bisphosphonates, reclast, Denosumab,  and Teriparatide.   -Treatment:   Fosamax  Start 1/2021  Noncompliant. Considering switching to Reclast pending BMD.   -Calcium and Vitamin D RDD provided.  -Exercise: recommended.  -Fall precautions made in the home.  -Alerted that if dental work needs to be done it should be done prior to initiating therapy. Dental health: needs to make an appointment.  -- Repeat DEXA scan now.      Follow up in about 1 year (around 3/21/2025).

## 2024-03-20 NOTE — TELEPHONE ENCOUNTER
No care due was identified.  Health [facility] Embedded Care Due Messages. Reference number: [id number].   3/20/2024 5:42:02 AM CDT

## 2024-03-21 ENCOUNTER — OFFICE VISIT (OUTPATIENT)
Dept: ENDOCRINOLOGY | Facility: CLINIC | Age: 69
End: 2024-03-21
Payer: COMMERCIAL

## 2024-03-21 VITALS
OXYGEN SATURATION: 97 % | BODY MASS INDEX: 22.49 KG/M2 | HEIGHT: 68 IN | DIASTOLIC BLOOD PRESSURE: 67 MMHG | HEART RATE: 62 BPM | SYSTOLIC BLOOD PRESSURE: 110 MMHG | WEIGHT: 148.38 LBS

## 2024-03-21 DIAGNOSIS — E03.9 HYPOTHYROIDISM, UNSPECIFIED TYPE: Primary | ICD-10-CM

## 2024-03-21 DIAGNOSIS — M81.0 AGE-RELATED OSTEOPOROSIS WITHOUT CURRENT PATHOLOGICAL FRACTURE: ICD-10-CM

## 2024-03-21 PROCEDURE — 3008F BODY MASS INDEX DOCD: CPT | Mod: CPTII,S$GLB,, | Performed by: NURSE PRACTITIONER

## 2024-03-21 PROCEDURE — 1126F AMNT PAIN NOTED NONE PRSNT: CPT | Mod: CPTII,S$GLB,, | Performed by: NURSE PRACTITIONER

## 2024-03-21 PROCEDURE — 3078F DIAST BP <80 MM HG: CPT | Mod: CPTII,S$GLB,, | Performed by: NURSE PRACTITIONER

## 2024-03-21 PROCEDURE — 1160F RVW MEDS BY RX/DR IN RCRD: CPT | Mod: CPTII,S$GLB,, | Performed by: NURSE PRACTITIONER

## 2024-03-21 PROCEDURE — 1159F MED LIST DOCD IN RCRD: CPT | Mod: CPTII,S$GLB,, | Performed by: NURSE PRACTITIONER

## 2024-03-21 PROCEDURE — 99214 OFFICE O/P EST MOD 30 MIN: CPT | Mod: S$GLB,,, | Performed by: NURSE PRACTITIONER

## 2024-03-21 PROCEDURE — 3074F SYST BP LT 130 MM HG: CPT | Mod: CPTII,S$GLB,, | Performed by: NURSE PRACTITIONER

## 2024-03-21 PROCEDURE — 99999 PR PBB SHADOW E&M-EST. PATIENT-LVL V: CPT | Mod: PBBFAC,,, | Performed by: NURSE PRACTITIONER

## 2024-03-21 PROCEDURE — 3288F FALL RISK ASSESSMENT DOCD: CPT | Mod: CPTII,S$GLB,, | Performed by: NURSE PRACTITIONER

## 2024-03-21 PROCEDURE — 1101F PT FALLS ASSESS-DOCD LE1/YR: CPT | Mod: CPTII,S$GLB,, | Performed by: NURSE PRACTITIONER

## 2024-03-21 NOTE — ASSESSMENT & PLAN NOTE
-- Labs now.  -- Calcium and iron need to be  from thyroid hormone replacement by 4 or > hours.  -- Please note: if you are taking biotin please hold it for 5 days prior to labs as it can interfere with the thyroid testing.  -- Medication Changes:   Levothyroxine 75mcg daily   -- Clinically and biochemically euthyroid.  -- Goal is a normal TSH.  -- Avoid exogenous hyperthyroidism as this can accelerate bone loss and increase risk of CV complications.  -- Advised to take LT4 on an empty stomach with water and to wait 30-45 minutes before eating or taking other medications   -- Reviewed usual times of thyroid hormone changes  -- Reviewed that symptoms of hypothyroidism may not correlate with tsh, and a normal TSH is the goal of therapy. Symptoms are not a justification for over treatment.

## 2024-04-01 ENCOUNTER — HOSPITAL ENCOUNTER (OUTPATIENT)
Dept: RADIOLOGY | Facility: OTHER | Age: 69
Discharge: HOME | End: 2024-04-01
Attending: NURSE PRACTITIONER
Payer: COMMERCIAL

## 2024-04-01 ENCOUNTER — PATIENT MESSAGE (OUTPATIENT)
Dept: ENDOCRINOLOGY | Facility: CLINIC | Age: 69
End: 2024-04-01
Payer: COMMERCIAL

## 2024-04-01 DIAGNOSIS — M81.0 AGE-RELATED OSTEOPOROSIS WITHOUT CURRENT PATHOLOGICAL FRACTURE: ICD-10-CM

## 2024-04-01 PROCEDURE — 77080 DXA BONE DENSITY AXIAL: CPT | Mod: TC

## 2024-04-01 PROCEDURE — 77080 DXA BONE DENSITY AXIAL: CPT | Mod: 26,,, | Performed by: RADIOLOGY

## 2024-04-23 ENCOUNTER — HOSPITAL ENCOUNTER (OUTPATIENT)
Dept: RADIOLOGY | Facility: HOSPITAL | Age: 69
Discharge: HOME | End: 2024-04-23
Attending: STUDENT IN AN ORGANIZED HEALTH CARE EDUCATION/TRAINING PROGRAM
Payer: COMMERCIAL

## 2024-04-23 ENCOUNTER — PATIENT MESSAGE (OUTPATIENT)
Dept: INTERNAL MEDICINE | Facility: CLINIC | Age: 69
End: 2024-04-23
Payer: COMMERCIAL

## 2024-04-23 DIAGNOSIS — Z12.31 SCREENING MAMMOGRAM FOR BREAST CANCER: ICD-10-CM

## 2024-04-23 PROCEDURE — 77067 SCR MAMMO BI INCL CAD: CPT | Mod: 26,,, | Performed by: RADIOLOGY

## 2024-04-23 PROCEDURE — 77067 SCR MAMMO BI INCL CAD: CPT | Mod: TC

## 2024-04-23 PROCEDURE — 77063 BREAST TOMOSYNTHESIS BI: CPT | Mod: 26,,, | Performed by: RADIOLOGY

## 2024-05-09 ENCOUNTER — PATIENT MESSAGE (OUTPATIENT)
Dept: ENDOCRINOLOGY | Facility: CLINIC | Age: 69
End: 2024-05-09
Payer: COMMERCIAL

## 2024-05-09 DIAGNOSIS — M81.0 AGE-RELATED OSTEOPOROSIS WITHOUT CURRENT PATHOLOGICAL FRACTURE: ICD-10-CM

## 2024-05-09 RX ORDER — ALENDRONATE SODIUM 70 MG/1
70 TABLET ORAL
Qty: 12 TABLET | Refills: 2 | Status: SHIPPED | OUTPATIENT
Start: 2024-05-09

## 2024-05-17 DIAGNOSIS — E03.9 HYPOTHYROIDISM, UNSPECIFIED TYPE: ICD-10-CM

## 2024-05-17 RX ORDER — LEVOTHYROXINE SODIUM 75 UG/1
75 TABLET ORAL DAILY
Qty: 90 TABLET | Refills: 2 | Status: SHIPPED | OUTPATIENT
Start: 2024-05-17 | End: 2025-05-17

## 2024-05-17 NOTE — TELEPHONE ENCOUNTER
Care Due:                  Date            Visit Type   Department     Provider  --------------------------------------------------------------------------------                                EP -                              PRIMARY      [facility] INTERNAL  Last Visit: 08-      CARE ([facility])   MEDICINE       Michael Carrion  Next Visit: None Scheduled  None         None Found                                                            Last  Test          Frequency    Reason                     Performed    Due Date  --------------------------------------------------------------------------------    Office Visit  15 months..  levothyroxine............  08-   11-    [facility] Embedded Care Due Messages. Reference number: [id number].   5/17/2024 5:12:56 AM CDT

## 2024-09-27 ENCOUNTER — PATIENT MESSAGE (OUTPATIENT)
Dept: INTERNAL MEDICINE | Facility: CLINIC | Age: 69
End: 2024-09-27
Payer: COMMERCIAL

## 2024-09-27 DIAGNOSIS — Z83.518 FAMILY HISTORY OF EYE DISEASE: Primary | ICD-10-CM

## 2024-10-01 ENCOUNTER — PATIENT MESSAGE (OUTPATIENT)
Dept: FAMILY MEDICINE | Facility: CLINIC | Age: 69
End: 2024-10-01
Payer: COMMERCIAL

## 2024-10-08 ENCOUNTER — OFFICE VISIT (OUTPATIENT)
Dept: OPTOMETRY | Facility: CLINIC | Age: 69
End: 2024-10-08
Payer: COMMERCIAL

## 2024-10-08 DIAGNOSIS — Z83.511 FAMILY HISTORY OF GLAUCOMA IN FATHER: ICD-10-CM

## 2024-10-08 DIAGNOSIS — H35.433 COBBLESTONE RETINAL DEGENERATION, BILATERAL: ICD-10-CM

## 2024-10-08 DIAGNOSIS — H47.393 OPTIC NERVE CUPPING OF BOTH EYES: ICD-10-CM

## 2024-10-08 DIAGNOSIS — Z83.511 FAMILY HISTORY OF GLAUCOMA IN BROTHER: ICD-10-CM

## 2024-10-08 DIAGNOSIS — H25.13 NS (NUCLEAR SCLEROSIS), BILATERAL: ICD-10-CM

## 2024-10-08 DIAGNOSIS — Z83.518 FAMILY HISTORY OF EYE DISEASE: ICD-10-CM

## 2024-10-08 DIAGNOSIS — H52.7 REFRACTIVE ERROR: Primary | ICD-10-CM

## 2024-10-08 PROCEDURE — 3044F HG A1C LEVEL LT 7.0%: CPT | Mod: CPTII,S$GLB,, | Performed by: OPTOMETRIST

## 2024-10-08 PROCEDURE — 1159F MED LIST DOCD IN RCRD: CPT | Mod: CPTII,S$GLB,, | Performed by: OPTOMETRIST

## 2024-10-08 PROCEDURE — 1126F AMNT PAIN NOTED NONE PRSNT: CPT | Mod: CPTII,S$GLB,, | Performed by: OPTOMETRIST

## 2024-10-08 PROCEDURE — 92133 CPTRZD OPH DX IMG PST SGM ON: CPT | Mod: S$GLB,,, | Performed by: OPTOMETRIST

## 2024-10-08 PROCEDURE — 92015 DETERMINE REFRACTIVE STATE: CPT | Mod: S$GLB,,, | Performed by: OPTOMETRIST

## 2024-10-08 PROCEDURE — 3288F FALL RISK ASSESSMENT DOCD: CPT | Mod: CPTII,S$GLB,, | Performed by: OPTOMETRIST

## 2024-10-08 PROCEDURE — 1101F PT FALLS ASSESS-DOCD LE1/YR: CPT | Mod: CPTII,S$GLB,, | Performed by: OPTOMETRIST

## 2024-10-08 PROCEDURE — 99999 PR PBB SHADOW E&M-EST. PATIENT-LVL III: CPT | Mod: PBBFAC,,, | Performed by: OPTOMETRIST

## 2024-10-08 PROCEDURE — 92004 COMPRE OPH EXAM NEW PT 1/>: CPT | Mod: S$GLB,,, | Performed by: OPTOMETRIST

## 2024-10-08 NOTE — PROGRESS NOTES
RAINA    WALDEMAR: 2/9/2017 - Dr. Hunter     CC: Pt is here today for a routine eye exam. Pt states that she has a   family history of glaucoma. Pt states that her vision has been stable.    (-)Dryness  (-)Burning  (-)Itchiness  (-)Tearing  (-)Flashes  (-)Floaters   (-)Photophobia  (-)Eye Pain      Diabetic: no    Contact Lens: no    Eye Meds: AT's Qam     PD:65.5    Last edited by Viviane Ocampo MA on 10/8/2024  2:21 PM.            Assessment /Plan     For exam results, see Encounter Report.    Refractive error  Myopia, bilateral  Presbyopia               Rx specs, SV distance                   Family history of glaucoma in brother (had LPI) and father  Glaucoma suspect, bilateral  -     2017 Posterior Segment OCT Optic Nerve- WNL OU        Prior testing WNL in 2013      Cobblestone degeneration inferior OU  NS (nuclear sclerosis), bilateral   Monitor yearly         RTC 1 year, needs GONIO next visit

## 2024-10-20 NOTE — ASSESSMENT & PLAN NOTE
-- Risks include low weight,  race, menopause.  -- Reviewed basics of quantity versus quality.  -- Reassuring they are not fracturing.  -- Recommend:  -Pharmacological therapy is recommended for patients with osteopenia if the 10 year probability of a hip fracture is >3% and 10 year probability of other major osteoporotic fractures is >20%.  Treatment options and potential side effects discussed for PO bisphosphonates, reclast, Denosumab, and Teriparatide.   -Treatment:   Fosamax  Start 1/2021  Noncompliant. Considering switching to Reclast pending BMD.   -Calcium and Vitamin D RDD provided.  -Exercise: recommended.  -Fall precautions made in the home.  -Alerted that if dental work needs to be done it should be done prior to initiating therapy. Dental health: needs to make an appointment.  -- Repeat DEXA scan now.   MILD

## 2025-02-06 DIAGNOSIS — E03.9 HYPOTHYROIDISM, UNSPECIFIED TYPE: ICD-10-CM

## 2025-02-06 RX ORDER — LEVOTHYROXINE SODIUM 75 UG/1
75 TABLET ORAL DAILY
Qty: 90 TABLET | Refills: 0 | Status: SHIPPED | OUTPATIENT
Start: 2025-02-06 | End: 2026-02-06

## 2025-02-06 NOTE — TELEPHONE ENCOUNTER
Care Due:                  Date            Visit Type   Department     Provider  --------------------------------------------------------------------------------    Last Visit: None Found      None         None Found  Next Visit: None Scheduled  None         None Found                                                            Last  Test          Frequency    Reason                     Performed    Due Date  --------------------------------------------------------------------------------    Office Visit  15 months..  levothyroxine............  Not Found    Overdue    TSH.........  12 months..  levothyroxine............  04- 03-    Health [facility] Embedded Care Due Messages. Reference number: [id number].   2/06/2025 10:59:57 AM CST

## 2025-02-06 NOTE — TELEPHONE ENCOUNTER
Refill Encounter    PCP Visits: Recent Visits  No visits were found meeting these conditions.  Showing recent visits within past 360 days and meeting all other requirements  Future Appointments  No visits were found meeting these conditions.  Showing future appointments within next 720 days and meeting all other requirements     Last 3 Blood Pressure:   BP Readings from Last 3 Encounters:   03/21/24 110/67   10/04/23 118/64   08/10/22 120/68     Preferred Pharmacy:   [facility]  [address] [address]  [place] [address]  Phone: [phone] Fax: [phone]    Requested RX:  Requested Prescriptions     Pending Prescriptions Disp Refills    levothyroxine (SYNTHROID) 75 MCG tablet 90 tablet 2     Sig: Take 1 tablet (75 mcg total) by mouth once daily.      RX Route: Normal

## 2025-05-03 DIAGNOSIS — E03.9 HYPOTHYROIDISM, UNSPECIFIED TYPE: ICD-10-CM

## 2025-05-03 NOTE — TELEPHONE ENCOUNTER
Care Due:                  Date            Visit Type   Department     Provider  --------------------------------------------------------------------------------    Last Visit: None Found      None         None Found  Next Visit: None Scheduled  None         None Found                                                            Last  Test          Frequency    Reason                     Performed    Due Date  --------------------------------------------------------------------------------    Office Visit  15 months..  levothyroxine............  Not Found    Overdue    TSH.........  12 months..  levothyroxine............  04- 03-    Health [facility] Embedded Care Due Messages. Reference number: [id number].   5/03/2025 11:29:02 AM PARISA

## 2025-05-05 RX ORDER — LEVOTHYROXINE SODIUM 75 UG/1
75 TABLET ORAL DAILY
Qty: 10 TABLET | Refills: 0 | Status: SHIPPED | OUTPATIENT
Start: 2025-05-05 | End: 2026-05-05

## 2025-05-14 ENCOUNTER — TELEPHONE (OUTPATIENT)
Dept: INTERNAL MEDICINE | Facility: CLINIC | Age: 70
End: 2025-05-14
Payer: COMMERCIAL

## 2025-05-14 DIAGNOSIS — Z12.31 OTHER SCREENING MAMMOGRAM: Primary | ICD-10-CM

## 2025-05-14 DIAGNOSIS — Z12.31 ENCOUNTER FOR SCREENING MAMMOGRAM FOR BREAST CANCER: ICD-10-CM

## 2025-05-14 NOTE — TELEPHONE ENCOUNTER
----- Message from Med Assistant Savanah sent at 5/14/2025  1:01 PM CDT -----  Regarding: Return Call  Message Type: Return Call Who Called:ELEAZAR SY [[id number]]  Who Left Message for Patient:Dae Cardoza MA  Does the patient know what this is regarding?  Yes   Best Call Back Number:[phone]   Additional Information: Patient is returning a call.  Please assist.

## 2025-05-14 NOTE — TELEPHONE ENCOUNTER
----- Message from Kiersten sent at 5/14/2025  9:32 AM CDT -----  Type:  Patient Requesting ReferralWho Called:ptDoes the patient already have the specialty appointment scheduled?:noIf yes, what is the date of that appointment?:n/aReferral to What Specialty:LabReason for Referral:AnnualDoes the patient want the referral with a specific physician?:yesIs the specialist an [facility] Requesting a Response?:yesWould the patient rather a call back or a response via [facility]? callBest Call Back Number: [phone] Information:

## 2025-05-16 ENCOUNTER — TELEPHONE (OUTPATIENT)
Dept: INTERNAL MEDICINE | Facility: CLINIC | Age: 70
End: 2025-05-16
Payer: COMMERCIAL

## 2025-05-16 DIAGNOSIS — Z00.00 PREVENTATIVE HEALTH CARE: ICD-10-CM

## 2025-05-16 DIAGNOSIS — M81.0 OSTEOPOROSIS, UNSPECIFIED OSTEOPOROSIS TYPE, UNSPECIFIED PATHOLOGICAL FRACTURE PRESENCE: ICD-10-CM

## 2025-05-16 DIAGNOSIS — M81.0 AGE-RELATED OSTEOPOROSIS WITHOUT CURRENT PATHOLOGICAL FRACTURE: ICD-10-CM

## 2025-05-16 DIAGNOSIS — E03.9 HYPOTHYROIDISM, UNSPECIFIED TYPE: ICD-10-CM

## 2025-05-16 NOTE — TELEPHONE ENCOUNTER
----- Message from BetoVerslynova sent at 5/16/2025  1:03 PM CDT -----  Type:  dexa and lab for annual Caller is requesting to schedule their annual lab and dexa appointment.  Order is not listed in EPIC.  Please enter order and contact patient to schedule.Name of Caller: PtWhere would they like the dexa and lab performed? bapcWould the patient rather a call back or a response via [facility]? Call backBest Call Back Number: Telephone Information:[employer]          [phone] Information:

## 2025-05-16 NOTE — TELEPHONE ENCOUNTER
Left voice mail for patient to call the office back to schedule lab appointment, dexa scan is too early.

## 2025-05-20 ENCOUNTER — TELEPHONE (OUTPATIENT)
Dept: INTERNAL MEDICINE | Facility: CLINIC | Age: 70
End: 2025-05-20
Payer: COMMERCIAL

## 2025-05-20 ENCOUNTER — PATIENT MESSAGE (OUTPATIENT)
Dept: INTERNAL MEDICINE | Facility: CLINIC | Age: 70
End: 2025-05-20
Payer: COMMERCIAL

## 2025-05-20 DIAGNOSIS — E03.9 HYPOTHYROIDISM, UNSPECIFIED TYPE: ICD-10-CM

## 2025-05-20 RX ORDER — LEVOTHYROXINE SODIUM 75 UG/1
75 TABLET ORAL DAILY
Qty: 10 TABLET | Refills: 0 | Status: SHIPPED | OUTPATIENT
Start: 2025-05-20 | End: 2026-05-20

## 2025-05-20 NOTE — TELEPHONE ENCOUNTER
----- Message from Radha sent at 5/20/2025  8:56 AM CDT -----  Type:  Patient Returning Call Who Called:Self  Who Left Message for Patient:DELEASE Does the patient know what this is regarding?:Regarding orders for pt  Would the patient rather a call back or a response via [facility]?  Call back Best Call Back Number:.[phone]  Additional Information: Thank you.

## 2025-05-20 NOTE — TELEPHONE ENCOUNTER
----- Message from Chandler sent at 5/20/2025  8:10 AM CDT -----  Type:  Patient Returning CallWho Called:ptSin Left Message for Patient: -Does the patient know what this is regarding?:requesting ordersWould the patient rather a call back or a response [facility]? [email or web address] Call Back Number:[phone] Information: pt states she would like to get an update on order request for dexa bone density. Thank you

## 2025-05-20 NOTE — TELEPHONE ENCOUNTER
Spoke to patient and informed her that her last bone density test was on 4/1/24 and it will be too soon for another one right now. Patient asked that I send her some female doctors that's accepting new patients. Message sent through portal.

## 2025-05-20 NOTE — TELEPHONE ENCOUNTER
No care due was identified.  [facility] Embedded Care Due Messages. Reference number: [id number].   5/20/2025 10:55:41 AM CDT

## 2025-05-28 ENCOUNTER — TELEPHONE (OUTPATIENT)
Dept: INTERNAL MEDICINE | Facility: CLINIC | Age: 70
End: 2025-05-28

## 2025-06-09 ENCOUNTER — HOSPITAL ENCOUNTER (OUTPATIENT)
Dept: RADIOLOGY | Facility: OTHER | Age: 70
Discharge: HOME | End: 2025-06-09
Attending: STUDENT IN AN ORGANIZED HEALTH CARE EDUCATION/TRAINING PROGRAM
Payer: COMMERCIAL

## 2025-06-09 ENCOUNTER — OFFICE VISIT (OUTPATIENT)
Dept: INTERNAL MEDICINE | Facility: CLINIC | Age: 70
End: 2025-06-09
Payer: COMMERCIAL

## 2025-06-09 VITALS
DIASTOLIC BLOOD PRESSURE: 57 MMHG | HEIGHT: 68 IN | HEART RATE: 62 BPM | BODY MASS INDEX: 22.72 KG/M2 | OXYGEN SATURATION: 98 % | WEIGHT: 149.94 LBS | SYSTOLIC BLOOD PRESSURE: 114 MMHG

## 2025-06-09 DIAGNOSIS — Z12.31 ENCOUNTER FOR SCREENING MAMMOGRAM FOR BREAST CANCER: ICD-10-CM

## 2025-06-09 DIAGNOSIS — Z80.0 FAMILY HISTORY OF COLON CANCER: ICD-10-CM

## 2025-06-09 DIAGNOSIS — M81.0 AGE-RELATED OSTEOPOROSIS WITHOUT CURRENT PATHOLOGICAL FRACTURE: ICD-10-CM

## 2025-06-09 DIAGNOSIS — Z12.11 COLON CANCER SCREENING: ICD-10-CM

## 2025-06-09 DIAGNOSIS — Z00.00 ANNUAL PHYSICAL EXAM: Primary | ICD-10-CM

## 2025-06-09 PROBLEM — E78.00 PURE HYPERCHOLESTEROLEMIA: Status: RESOLVED | Noted: 2021-01-06 | Resolved: 2025-06-09

## 2025-06-09 PROCEDURE — 3078F DIAST BP <80 MM HG: CPT | Mod: CPTII,S$GLB,, | Performed by: FAMILY MEDICINE

## 2025-06-09 PROCEDURE — 77067 SCR MAMMO BI INCL CAD: CPT | Mod: 26,,, | Performed by: RADIOLOGY

## 2025-06-09 PROCEDURE — 77063 BREAST TOMOSYNTHESIS BI: CPT | Mod: 26,,, | Performed by: RADIOLOGY

## 2025-06-09 PROCEDURE — 1101F PT FALLS ASSESS-DOCD LE1/YR: CPT | Mod: CPTII,S$GLB,, | Performed by: FAMILY MEDICINE

## 2025-06-09 PROCEDURE — 99999 PR PBB SHADOW E&M-EST. PATIENT-LVL IV: CPT | Mod: PBBFAC,,, | Performed by: FAMILY MEDICINE

## 2025-06-09 PROCEDURE — 3008F BODY MASS INDEX DOCD: CPT | Mod: CPTII,S$GLB,, | Performed by: FAMILY MEDICINE

## 2025-06-09 PROCEDURE — 3074F SYST BP LT 130 MM HG: CPT | Mod: CPTII,S$GLB,, | Performed by: FAMILY MEDICINE

## 2025-06-09 PROCEDURE — 99397 PER PM REEVAL EST PAT 65+ YR: CPT | Mod: S$GLB,,, | Performed by: FAMILY MEDICINE

## 2025-06-09 PROCEDURE — 1126F AMNT PAIN NOTED NONE PRSNT: CPT | Mod: CPTII,S$GLB,, | Performed by: FAMILY MEDICINE

## 2025-06-09 PROCEDURE — 77067 SCR MAMMO BI INCL CAD: CPT | Mod: TC

## 2025-06-09 PROCEDURE — 3288F FALL RISK ASSESSMENT DOCD: CPT | Mod: CPTII,S$GLB,, | Performed by: FAMILY MEDICINE

## 2025-06-09 RX ORDER — ALENDRONATE SODIUM 70 MG/1
70 TABLET ORAL
Qty: 12 TABLET | Refills: 1 | Status: SHIPPED | OUTPATIENT
Start: 2025-06-09

## 2025-06-09 RX ORDER — PNV NO.95/FERROUS FUM/FOLIC AC 28MG-0.8MG
TABLET ORAL
Qty: 180 EACH | Refills: 1 | Status: SHIPPED | OUTPATIENT
Start: 2025-06-09

## 2025-06-09 NOTE — PROGRESS NOTES
Subjective:      Patient ID: Rosemary Parkinson is a 70 y.o. female.    Chief Complaint: Annual Exam      History of Present Illness    CHIEF COMPLAINT:  - Ms. Parkinson presents for an annual physical exam and medication refills.    HPI:  Ms. Parkinson is a 68-year-old female who presents for her annual physical exam and medication refills. She reports having completed fasting lab work just before the appointment and has a mammogram scheduled immediately following this visit. She has not yet scheduled a DEXA scan. She reports poor adherence with her weekly Fosamax medication, citing difficulty remembering to take it. She does not take any extra calcium or vitamin D supplements beyond her regular diet.    She denies symptoms of hypothyroidism (sluggishness, tiredness, constipation, dry skin) and hyperthyroidism (anxiety, jitteriness, palpitations). She will need a refill of her levothyroxine but has a small supply left.    She is active but does not dedicate time to exercise.    She has a family history of colon cancer and is due for a repeat colonoscopy. She's already had her labs for the physical drawn prior to this visit.    ALLERGIES:  - Demerol: dysphoria (not a true allergy)    MEDICAL HISTORY:  - Hypothyroidism  - Osteoporosis  - Shingles vaccine received   - Tetanus shot received in 2016  - Pneumonia vaccine received  - Hysterectomy: Complete  - : Yes for P3    SURGICAL HISTORY:  - Hysterectomy: Date not specified, ovaries and tubes removed  - Appendectomy: During childhood  - Tonsillectomy: Date not specified  - C-sections: 3    FAMILY HISTORY:  - Mother: Passed away at 98 years old, had idiopathic lung disease  - Father: Colon cancer, diagnosed in his 80s,  shortly after diagnosis  - Maternal grandmother:  of colon cancer in her 70s  - Mother's twin brother:  of idiopathic lung disease in his 60s    SOCIAL HISTORY:  - Alcohol: Rarely drinks, a few times per year, 2 drinks on average  Denies smoking  "cigarettes or vaping  Denies drug use  - 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  Service: Endoscopy;  Laterality: N/A;    COLONOSCOPY N/A 2022    Procedure: COLONOSCOPY;  Surgeon: Mati Banks MD;  Location: [facility] ([address] [place] [address]);  Service: Endoscopy;  Laterality: N/A;  fully vaccinated    HYSTERECTOMY      TONSILLECTOMY, ADENOIDECTOMY      TOTAL ABDOMINAL HYSTERECTOMY W/ BILATERAL SALPINGOOPHORECTOMY          Family History   Problem Relation Name Age of Onset    Other (idiopathic lung disease) Mother          lived until she was 98    Colon cancer Father 87     Glaucoma Father 87     Glaucoma Brother yolette     Cancer Maternal Grandmother  70        colon    Osteoporosis Neg Hx          Social History[3]     Lab Results   Component Value Date     2025    K 4.2 2025     2025    CO2 24 2025    GLU 83 2025    BUN 9 2025    CREATININE 0.8 2025    CALCIUM 9.0 2025    PROT 7.5 2025    ALBUMIN 4.1 2025    BILITOT 0.6 2025    ALKPHOS 59 2025    AST 18 2025    ALT 14 2025    ANIONGAP 13 2025    ESTGFRAFRICA >60 2021    EGFRNONAA >60 2021    WBC 4.86 2025    HGB 14.6 2025    HGB 13.4 2024    HCT 45.0 2025    MCV 91 2025     2025    TSH 0.387 (L) 2025    HEPCAB Negative 2025       Lab Results   Component Value Date    HGBA1C 5.2 2024    HGBA1C 5.1 2022    HGBA1C 5.1 2017     No results found for: "MICALBCREAT"  Lab " "Results   Component Value Date    LDLCALC 169.6 (H) 06/09/2025    LDLCALC 129.6 04/01/2024    CHOL 251 (H) 06/09/2025    HDL 60 06/09/2025    TRIG 107 06/09/2025       Review of Systems   Constitutional:  Negative for appetite change, chills, fever and unexpected weight change.   HENT:  Negative for ear pain, sinus pressure/congestion and sore throat.    Eyes:  Negative for visual disturbance.   Respiratory:  Negative for shortness of breath and wheezing.    Cardiovascular:  Negative for chest pain, palpitations and leg swelling.   Gastrointestinal:  Negative for abdominal pain, blood in stool, change in bowel habit, constipation, diarrhea and vomiting.   Genitourinary:  Negative for dysuria and hematuria.   Musculoskeletal:  Negative for arthralgias.   Integumentary:  Negative for rash.   Neurological:  Negative for dizziness and headaches.   Psychiatric/Behavioral:  Negative for depressed mood and sleep disturbance.         Vitals:    06/09/25 1409   BP: (!) 114/57   Pulse: 62   SpO2: 98%   Weight: 68 kg (149 lb 14.6 oz)   Height: 5' 8" (1.727 m)   PainSc: 0-No pain     Objective:   Physical Exam    General: Pleasant. No acute distress. Well-developed. Well-nourished.  Eyes: EOMBI. Sclerae anicteric.  HENT: Normocephalic. Atraumatic. Nares patent. Moist oral mucosa.  Ears: TM normal bilaterally  Cardiovascular: Regular rate and rhythm. No murmurs. No gallops. No rubs. Normal S1 and S2. Heart sounds normal.  Respiratory: Normal respiratory effort. Clear to auscultation bilaterally. No rales. No rhonchi. No wheezing.  Abdomen: Soft. Nontender. Nondistended. Normoactive bowel sounds.  Musculoskeletal: No obvious deformity. Normal range of motion.  Extremities: No lower extremity edema.  Neurological: Alert and lucid. Normal gait. No gross deficits.  Psychiatric: Normal mood. Normal affect. Good insight. Good judgment.  Skin: Warm. Intact.        Assessment/Plan       ICD-10-CM ICD-9-CM   1. Annual physical exam  " Z00.00 V70.0   2. Colon cancer screening  Z12.11 V76.51   3. Family history of colon cancer  Z80.0 V16.0   4. Age-related osteoporosis without current pathological fracture  M81.0 733.01        Assessment & Plan     Assessed osteoporosis management, reviewed most recent DEXA scan.   Reviewed cancer screening needs based on family history of colon cancer. Noted last colonoscopy in 2022 with recommendation for 3-year follow-up.    HYPOTHYROIDISM: Controlled.  - Thyroid function tests were drawn today  - Will refill medication if labs is normal or adjust as needed.    OSTEOPOROSIS:  - Reviewed DEXA scans  - Restarted alendronate weekly with instructions to take with full glass of water, remain upright for 30 minutes after administration, and set an alarm as reminder.  - Prescribed calcium 1200 mg daily (1 tablet twice daily) and vitamin D3 supplement to be taken with calcium per patient request  - Discussed importance of weight-bearing exercise (walking, Sherri, jump rope) regularly and explained benefits of calcium and vitamin D supplementation.       Annual physical exam    Colon cancer screening  -     Ambulatory referral/consult to Endo Procedure ; Future; Expected date: 06/10/2025    Family history of colon cancer  -     Ambulatory referral/consult to Endo Procedure ; Future; Expected date: 06/10/2025    Age-related osteoporosis without current pathological fracture  -     calcium carbonate-vitamin D3 500 mg-10 mcg (400 unit) Tab; 1 tablet by mouth twice a day  Dispense: 180 each; Refill: 1  -     alendronate (FOSAMAX) 70 MG tablet; Take 1 tablet (70 mg total) by mouth every 7 days. Take with a full glass of water & remain upright for at least 30 minutes.  Dispense: 12 tablet; Refill: 1           Chronic conditions status updated as per HPI.  Other than changes above, continue current medications and maintain follow up with specialists.      Follow up in about 1 year (around 6/9/2026), or if  symptoms worsen or fail to improve, for annual physical.         Lo Diego MD  [facility]    This note was generated with the assistance of ambient listening technology. Verbal consent was obtained by the patient and accompanying visitor(s) for the recording of patient appointment to facilitate this note. I attest to having reviewed and edited the generated note for accuracy, though some syntax or spelling errors may persist. Please contact the author of this note for any clarification.       Patient Instructions   Calcium 1200mg and vitamin D3 at 800-1000IU  Tests to Keep You Healthy    Mammogram: SCHEDULED  Colon Cancer Screening: Met on 6/1/2022      Immunization History   Administered Date(s) Administered    COVID-19 Vaccine 02/26/2021, 03/26/2021, 10/26/2021    Influenza - Intradermal - Quadrivalent - PF 12/10/2012    Influenza - Quadrivalent - PF *Preferred* (6 months and older) 09/25/2014, 01/18/2018, 10/19/2018, 11/04/2019    Influenza - Trivalent - Fluzone High Dose - PF (65 years and older) 10/07/2021    Pneumococcal Conjugate - 20 Valent 08/10/2022    Tdap 05/25/2016    Zoster Recombinant 10/22/2019, 08/10/2022                                     [1]   Patient Active Problem List  Diagnosis    Family history of colon cancer    Hypothyroid    Osteoporosis    Hx of adenomatous colonic polyps   [2]   Current Outpatient Medications:     levothyroxine (SYNTHROID) 75 MCG tablet, Take 1 tablet (75 mcg total) by mouth once daily. Needs appt with pcp please, Disp: 10 tablet, Rfl: 0    MULTIVIT-IRON-MIN-FOLIC ACID 3,500-18-0.4 UNIT-MG-MG ORAL CHEW, Take 1 tablet by mouth once daily at 6am., Disp: , Rfl:     TAZORAC 0.1 % cream, , Disp: , Rfl:     alendronate (FOSAMAX) 70 MG tablet, Take 1 tablet (70 mg total) by mouth every 7 days. Take with a full glass of water & remain upright for at least 30 minutes., Disp: 12 tablet, Rfl: 1    calcium carbonate-vitamin D3 500 mg-10 mcg (400 unit) Tab, 1  tablet by mouth twice a day, Disp: 180 each, Rfl: 1  [3]   Social History  Socioeconomic History    Marital status:     Number of children: 4   Occupational History     Employer: [employer]   Tobacco Use    Smoking status: Never    Smokeless tobacco: Never    Tobacco comments:     in college   Substance and Sexual Activity    Alcohol use: Yes     Alcohol/week: 2.0 standard drinks of alcohol     Types: 2 Glasses of wine per week     Comment: few times a year, 2 drinks at a time    Drug use: No     Social Drivers of Health     Financial Resource Strain: Low Risk  (6/6/2025)    Overall Financial Resource Strain (CARDIA)     Difficulty of Paying Living Expenses: Not hard at all   Food Insecurity: No Food Insecurity (6/6/2025)    Hunger Vital Sign     Worried About Running Out of Food in the Last Year: Never true     Ran Out of Food in the Last Year: Never true   Transportation Needs: No Transportation Needs (6/6/2025)    PRAPARE - Transportation     Lack of Transportation (Medical): No     Lack of Transportation (Non-Medical): No   Physical Activity: Inactive (6/6/2025)    Exercise Vital Sign     Days of Exercise per Week: 0 days     Minutes of Exercise per Session: 0 min   Stress: No Stress Concern Present (6/6/2025)    [employer] [place] [employer] - Occupational Stress Questionnaire     Feeling of Stress : Only a little   Housing Stability: Low Risk  (6/6/2025)    Housing Stability Vital Sign     Unable to Pay for Housing in the Last Year: No     Number of Times Moved in the Last Year: 0     Homeless in the Last Year: No

## 2025-06-10 ENCOUNTER — RESULTS FOLLOW-UP (OUTPATIENT)
Dept: INTERNAL MEDICINE | Facility: CLINIC | Age: 70
End: 2025-06-10
Payer: COMMERCIAL

## 2025-06-10 DIAGNOSIS — E03.9 HYPOTHYROIDISM, UNSPECIFIED TYPE: Primary | ICD-10-CM

## 2025-06-11 RX ORDER — LEVOTHYROXINE SODIUM 75 UG/1
75 TABLET ORAL
Qty: 30 TABLET | Refills: 0 | Status: SHIPPED | OUTPATIENT
Start: 2025-06-11 | End: 2025-07-11

## 2025-06-11 NOTE — TELEPHONE ENCOUNTER
Medication Pending   Allergies Confirm   LOV 06/09/2025        Refill Encounter    PCP Visits: Recent Visits  Date Type Provider Dept   06/09/25 Office Visit Lo Diego MD [facility] Internal Medicine   Showing recent visits within past 360 days and meeting all other requirements  Future Appointments  No visits were found meeting these conditions.  Showing future appointments within next 720 days and meeting all other requirements      Last 3 Blood Pressure:   BP Readings from Last 3 Encounters:   06/09/25 (!) 114/57   03/21/24 110/67   10/04/23 118/64     Preferred Pharmacy:   [facility]  [address]  [place] [address]  Phone: [phone] Fax: [phone]    Requested RX:  Requested Prescriptions     Pending Prescriptions Disp Refills    levothyroxine (SYNTHROID) 75 MCG tablet 10 tablet 0     Sig: Take 1 tablet (75 mcg total) by mouth once daily. Needs appt with pcp please      RX Route: Normal

## 2025-07-10 DIAGNOSIS — E03.9 HYPOTHYROIDISM, UNSPECIFIED TYPE: ICD-10-CM

## 2025-07-11 ENCOUNTER — CLINICAL SUPPORT (OUTPATIENT)
Dept: ENDOSCOPY | Facility: HOSPITAL | Age: 70
End: 2025-07-11
Attending: FAMILY MEDICINE
Payer: COMMERCIAL

## 2025-07-11 ENCOUNTER — TELEPHONE (OUTPATIENT)
Dept: ENDOSCOPY | Facility: HOSPITAL | Age: 70
End: 2025-07-11

## 2025-07-11 VITALS — WEIGHT: 115 LBS | BODY MASS INDEX: 17.43 KG/M2 | HEIGHT: 68 IN

## 2025-07-11 DIAGNOSIS — Z12.11 COLON CANCER SCREENING: ICD-10-CM

## 2025-07-11 DIAGNOSIS — Z12.11 SPECIAL SCREENING FOR MALIGNANT NEOPLASMS, COLON: Primary | ICD-10-CM

## 2025-07-11 DIAGNOSIS — Z80.0 FAMILY HISTORY OF COLON CANCER: ICD-10-CM

## 2025-07-11 RX ORDER — SOD SULF/POT CHLORIDE/MAG SULF 1.479 G
12 TABLET ORAL DAILY
Qty: 24 TABLET | Refills: 0 | Status: SHIPPED | OUTPATIENT
Start: 2025-07-11

## 2025-07-11 RX ORDER — LEVOTHYROXINE SODIUM 75 UG/1
75 TABLET ORAL
Qty: 30 TABLET | Refills: 0 | Status: SHIPPED | OUTPATIENT
Start: 2025-07-11 | End: 2025-08-11

## 2025-07-11 NOTE — TELEPHONE ENCOUNTER
Patient is scheduled for a Colonoscopy on 9/3/25 with Dr. Augustin  Referral for procedure from PAT appointment

## 2025-07-11 NOTE — TELEPHONE ENCOUNTER
Medication Pending   Allergies Confirm   LOV  06/09/2025    Refill Encounter    PCP Visits: Recent Visits  Date Type Provider Dept   06/09/25 Office Visit Lo Diego MD [facility] Internal Medicine   Showing recent visits within past 360 days and meeting all other requirements  Future Appointments  No visits were found meeting these conditions.  Showing future appointments within next 720 days and meeting all other requirements      Last 3 Blood Pressure:   BP Readings from Last 3 Encounters:   06/09/25 (!) 114/57   03/21/24 110/67   10/04/23 118/64     Preferred Pharmacy:   [facility]  [address] [address]  [place] [address]  Phone: [phone] Fax: [phone]  Requested RX:  Requested Prescriptions     Pending Prescriptions Disp Refills    levothyroxine (SYNTHROID) 75 MCG tablet 30 tablet 0     Sig: Take 1 tablet (75 mcg total) by mouth before breakfast.      RX Route: Normal

## 2025-08-05 DIAGNOSIS — E03.9 HYPOTHYROIDISM, UNSPECIFIED TYPE: ICD-10-CM

## 2025-08-05 RX ORDER — LEVOTHYROXINE SODIUM 75 UG/1
75 TABLET ORAL
Qty: 30 TABLET | Refills: 0 | Status: SHIPPED | OUTPATIENT
Start: 2025-08-05 | End: 2025-09-04

## 2025-08-05 NOTE — TELEPHONE ENCOUNTER
Refill Encounter    PCP Visits: Recent Visits  Date Type Provider Dept   06/09/25 Office Visit Lo Diego MD [facility] Internal Medicine   Showing recent visits within past 360 days and meeting all other requirements  Future Appointments  No visits were found meeting these conditions.  Showing future appointments within next 720 days and meeting all other requirements      Last 3 Blood Pressure:   BP Readings from Last 3 Encounters:   06/09/25 (!) 114/57   03/21/24 110/67   10/04/23 118/64     Preferred Pharmacy:   [facility]  [address] [address]  [place] [address]  Phone: [phone] Fax: [phone]    Requested RX:  Requested Prescriptions     Pending Prescriptions Disp Refills    levothyroxine (SYNTHROID) 75 MCG tablet 30 tablet 0     Sig: Take 1 tablet (75 mcg total) by mouth before breakfast.      RX Route: Normal

## 2025-08-27 ENCOUNTER — TELEPHONE (OUTPATIENT)
Dept: ENDOSCOPY | Facility: HOSPITAL | Age: 70
End: 2025-08-27
Payer: COMMERCIAL

## 2025-09-02 ENCOUNTER — ANESTHESIA EVENT (OUTPATIENT)
Dept: ENDOSCOPY | Facility: HOSPITAL | Age: 70
End: 2025-09-02
Payer: COMMERCIAL

## 2025-09-02 ENCOUNTER — NURSE TRIAGE (OUTPATIENT)
Dept: ADMINISTRATIVE | Facility: CLINIC | Age: 70
End: 2025-09-02
Payer: COMMERCIAL

## 2025-09-03 ENCOUNTER — ANESTHESIA (OUTPATIENT)
Dept: ENDOSCOPY | Facility: HOSPITAL | Age: 70
End: 2025-09-03
Payer: COMMERCIAL

## 2025-09-03 ENCOUNTER — TELEPHONE (OUTPATIENT)
Dept: ENDOSCOPY | Facility: HOSPITAL | Age: 70
End: 2025-09-03
Payer: COMMERCIAL

## 2025-09-04 ENCOUNTER — PATIENT MESSAGE (OUTPATIENT)
Dept: INTERNAL MEDICINE | Facility: CLINIC | Age: 70
End: 2025-09-04
Payer: COMMERCIAL

## 2025-09-04 DIAGNOSIS — E03.9 HYPOTHYROIDISM, UNSPECIFIED TYPE: ICD-10-CM

## 2025-09-04 PROCEDURE — 25000003 PHARM REV CODE 250: Performed by: INTERNAL MEDICINE

## 2025-09-04 PROCEDURE — 63600175 PHARM REV CODE 636 W HCPCS: Performed by: NURSE ANESTHETIST, CERTIFIED REGISTERED

## 2025-09-04 RX ORDER — PROPOFOL 10 MG/ML
VIAL (ML) INTRAVENOUS CONTINUOUS PRN
Status: DISCONTINUED | OUTPATIENT
Start: 2025-09-04 | End: 2025-09-04

## 2025-09-04 RX ORDER — LIDOCAINE HYDROCHLORIDE 20 MG/ML
INJECTION INTRAVENOUS
Status: DISCONTINUED | OUTPATIENT
Start: 2025-09-04 | End: 2025-09-04

## 2025-09-04 RX ORDER — PROPOFOL 10 MG/ML
VIAL (ML) INTRAVENOUS
Status: DISCONTINUED | OUTPATIENT
Start: 2025-09-04 | End: 2025-09-04

## 2025-09-04 RX ADMIN — PROPOFOL 50 MG: 10 INJECTION, EMULSION INTRAVENOUS at 08:09

## 2025-09-04 RX ADMIN — LIDOCAINE HYDROCHLORIDE 50 MG: 20 INJECTION INTRAVENOUS at 08:09

## 2025-09-04 RX ADMIN — SODIUM CHLORIDE: 0.9 INJECTION, SOLUTION INTRAVENOUS at 08:09

## 2025-09-04 RX ADMIN — PROPOFOL 150 MCG/KG/MIN: 10 INJECTION, EMULSION INTRAVENOUS at 08:09

## 2025-09-04 RX ADMIN — PROPOFOL 20 MG: 10 INJECTION, EMULSION INTRAVENOUS at 08:09

## 2025-09-05 RX ORDER — LEVOTHYROXINE SODIUM 75 UG/1
75 TABLET ORAL
Qty: 90 TABLET | Refills: 1 | Status: SHIPPED | OUTPATIENT
Start: 2025-09-05